# Patient Record
Sex: FEMALE
[De-identification: names, ages, dates, MRNs, and addresses within clinical notes are randomized per-mention and may not be internally consistent; named-entity substitution may affect disease eponyms.]

---

## 2023-05-31 ENCOUNTER — HOSPITAL ENCOUNTER (OUTPATIENT)
Facility: HOSPITAL | Age: 33
Setting detail: SPECIMEN
Discharge: HOME | End: 2023-06-03

## 2023-05-31 LAB
-: NORMAL
25(OH)D3 SERPL-MCNC: 13.6 NG/ML (ref 30–100)
ALBUMIN SERPL-MCNC: 4 G/DL (ref 3.5–5)
ALBUMIN/GLOB SERPL: 1.2 (ref 1.1–2.2)
ALP SERPL-CCNC: 101 U/L (ref 45–117)
ALT SERPL-CCNC: 67 U/L (ref 12–78)
ANION GAP SERPL CALC-SCNC: 4 MMOL/L (ref 5–15)
APPEARANCE UR: CLEAR
AST SERPL-CCNC: 37 U/L (ref 15–37)
BACTERIA URNS QL MICRO: NEGATIVE /HPF
BASOPHILS # BLD: 0 K/UL (ref 0–0.1)
BASOPHILS NFR BLD: 1 % (ref 0–1)
BILIRUB SERPL-MCNC: 0.4 MG/DL (ref 0.2–1)
BILIRUB UR QL: NEGATIVE
BUN SERPL-MCNC: 15 MG/DL (ref 6–20)
BUN/CREAT SERPL: 26 (ref 12–20)
CALCIUM SERPL-MCNC: 8.8 MG/DL (ref 8.5–10.1)
CHLORIDE SERPL-SCNC: 106 MMOL/L (ref 97–108)
CHOLEST SERPL-MCNC: 192 MG/DL
CO2 SERPL-SCNC: 29 MMOL/L (ref 21–32)
COLOR UR: ABNORMAL
CREAT SERPL-MCNC: 0.57 MG/DL (ref 0.55–1.02)
CREAT UR-MCNC: <13 MG/DL
DIFFERENTIAL METHOD BLD: ABNORMAL
EOSINOPHIL # BLD: 0.6 K/UL (ref 0–0.4)
EOSINOPHIL NFR BLD: 7 % (ref 0–7)
EPITH CASTS URNS QL MICRO: ABNORMAL /LPF
ERYTHROCYTE [DISTWIDTH] IN BLOOD BY AUTOMATED COUNT: 13 % (ref 11.5–14.5)
EST. AVERAGE GLUCOSE BLD GHB EST-MCNC: 105 MG/DL
GLOBULIN SER CALC-MCNC: 3.4 G/DL (ref 2–4)
GLUCOSE SERPL-MCNC: 100 MG/DL (ref 65–100)
GLUCOSE UR STRIP.AUTO-MCNC: NEGATIVE MG/DL
HAV IGM SER QL: NONREACTIVE
HBA1C MFR BLD: 5.3 % (ref 4–5.6)
HBV CORE IGM SER QL: NONREACTIVE
HBV SURFACE AG SER QL: <0.1 INDEX
HBV SURFACE AG SER QL: NEGATIVE
HCT VFR BLD AUTO: 39.4 % (ref 35–47)
HCV AB SERPL QL IA: NONREACTIVE
HDLC SERPL-MCNC: 38 MG/DL
HDLC SERPL: 5.1 (ref 0–5)
HGB BLD-MCNC: 13.1 G/DL (ref 11.5–16)
HGB UR QL STRIP: NEGATIVE
HIV 1+2 AB+HIV1 P24 AG SERPL QL IA: NONREACTIVE
HIV 1/2 RESULT COMMENT: NORMAL
HYALINE CASTS URNS QL MICRO: ABNORMAL /LPF (ref 0–5)
IMM GRANULOCYTES # BLD AUTO: 0 K/UL (ref 0–0.04)
IMM GRANULOCYTES NFR BLD AUTO: 0 % (ref 0–0.5)
KETONES UR QL STRIP.AUTO: NEGATIVE MG/DL
LDLC SERPL CALC-MCNC: 111 MG/DL (ref 0–100)
LEUKOCYTE ESTERASE UR QL STRIP.AUTO: ABNORMAL
LYMPHOCYTES # BLD: 4.1 K/UL (ref 0.8–3.5)
LYMPHOCYTES NFR BLD: 50 % (ref 12–49)
MCH RBC QN AUTO: 29.7 PG (ref 26–34)
MCHC RBC AUTO-ENTMCNC: 33.2 G/DL (ref 30–36.5)
MCV RBC AUTO: 89.3 FL (ref 80–99)
MICROALBUMIN UR-MCNC: <0.5 MG/DL
MICROALBUMIN/CREAT UR-RTO: <38 MG/G (ref 0–30)
MONOCYTES # BLD: 0.5 K/UL (ref 0–1)
MONOCYTES NFR BLD: 6 % (ref 5–13)
NEUTS SEG # BLD: 2.9 K/UL (ref 1.8–8)
NEUTS SEG NFR BLD: 36 % (ref 32–75)
NITRITE UR QL STRIP.AUTO: NEGATIVE
NRBC # BLD: 0 K/UL (ref 0–0.01)
NRBC BLD-RTO: 0 PER 100 WBC
PH UR STRIP: 6.5 (ref 5–8)
PLATELET # BLD AUTO: 260 K/UL (ref 150–400)
PMV BLD AUTO: 10.7 FL (ref 8.9–12.9)
POTASSIUM SERPL-SCNC: 4.2 MMOL/L (ref 3.5–5.1)
PROT SERPL-MCNC: 7.4 G/DL (ref 6.4–8.2)
PROT UR STRIP-MCNC: NEGATIVE MG/DL
RBC # BLD AUTO: 4.41 M/UL (ref 3.8–5.2)
RBC #/AREA URNS HPF: ABNORMAL /HPF (ref 0–5)
SODIUM SERPL-SCNC: 139 MMOL/L (ref 136–145)
SP GR UR REFRACTOMETRY: <1.005 (ref 1–1.03)
TRIGL SERPL-MCNC: 215 MG/DL
TSH SERPL DL<=0.05 MIU/L-ACNC: 1.58 UIU/ML (ref 0.36–3.74)
UROBILINOGEN UR QL STRIP.AUTO: 0.2 EU/DL (ref 0.2–1)
VLDLC SERPL CALC-MCNC: 43 MG/DL
WBC # BLD AUTO: 8.2 K/UL (ref 3.6–11)
WBC URNS QL MICRO: ABNORMAL /HPF (ref 0–4)

## 2023-05-31 PROCEDURE — 82043 UR ALBUMIN QUANTITATIVE: CPT

## 2023-05-31 PROCEDURE — 85025 COMPLETE CBC W/AUTO DIFF WBC: CPT

## 2023-05-31 PROCEDURE — 36415 COLL VENOUS BLD VENIPUNCTURE: CPT

## 2023-05-31 PROCEDURE — 80053 COMPREHEN METABOLIC PANEL: CPT

## 2023-05-31 PROCEDURE — 80061 LIPID PANEL: CPT

## 2023-05-31 PROCEDURE — 83036 HEMOGLOBIN GLYCOSYLATED A1C: CPT

## 2023-05-31 PROCEDURE — 82570 ASSAY OF URINE CREATININE: CPT

## 2023-05-31 PROCEDURE — 87389 HIV-1 AG W/HIV-1&-2 AB AG IA: CPT

## 2023-05-31 PROCEDURE — 82306 VITAMIN D 25 HYDROXY: CPT

## 2023-05-31 PROCEDURE — 84443 ASSAY THYROID STIM HORMONE: CPT

## 2023-05-31 PROCEDURE — 80074 ACUTE HEPATITIS PANEL: CPT

## 2023-05-31 PROCEDURE — 81001 URINALYSIS AUTO W/SCOPE: CPT

## 2023-09-22 ENCOUNTER — APPOINTMENT (OUTPATIENT)
Facility: HOSPITAL | Age: 33
End: 2023-09-22
Payer: COMMERCIAL

## 2023-09-22 ENCOUNTER — HOSPITAL ENCOUNTER (EMERGENCY)
Facility: HOSPITAL | Age: 33
Discharge: HOME | End: 2023-09-22
Attending: STUDENT IN AN ORGANIZED HEALTH CARE EDUCATION/TRAINING PROGRAM
Payer: COMMERCIAL

## 2023-09-22 VITALS
DIASTOLIC BLOOD PRESSURE: 65 MMHG | OXYGEN SATURATION: 95 % | WEIGHT: 164.02 LBS | RESPIRATION RATE: 16 BRPM | SYSTOLIC BLOOD PRESSURE: 106 MMHG | BODY MASS INDEX: 33.07 KG/M2 | TEMPERATURE: 98.8 F | HEART RATE: 70 BPM | HEIGHT: 59 IN

## 2023-09-22 DIAGNOSIS — R10.33 PERIUMBILICAL ABDOMINAL PAIN: ICD-10-CM

## 2023-09-22 DIAGNOSIS — K42.9 UMBILICAL HERNIA WITHOUT OBSTRUCTION AND WITHOUT GANGRENE: Primary | ICD-10-CM

## 2023-09-22 LAB
ALBUMIN SERPL-MCNC: 3.9 G/DL (ref 3.5–5)
ALBUMIN/GLOB SERPL: 1 (ref 1.1–2.2)
ALP SERPL-CCNC: 86 U/L (ref 45–117)
ALT SERPL-CCNC: 31 U/L (ref 12–78)
ANION GAP SERPL CALC-SCNC: 12 MMOL/L (ref 5–15)
APPEARANCE UR: CLEAR
AST SERPL-CCNC: 18 U/L (ref 15–37)
BACTERIA URNS QL MICRO: NEGATIVE /HPF
BASOPHILS # BLD: 0 K/UL (ref 0–0.1)
BASOPHILS NFR BLD: 0 % (ref 0–1)
BILIRUB SERPL-MCNC: 0.3 MG/DL (ref 0.2–1)
BILIRUB UR QL: NEGATIVE
BUN SERPL-MCNC: 12 MG/DL (ref 6–20)
BUN/CREAT SERPL: 19 (ref 12–20)
CALCIUM SERPL-MCNC: 8.5 MG/DL (ref 8.5–10.1)
CHLORIDE SERPL-SCNC: 104 MMOL/L (ref 97–108)
CO2 SERPL-SCNC: 24 MMOL/L (ref 21–32)
COLOR UR: ABNORMAL
CREAT SERPL-MCNC: 0.62 MG/DL (ref 0.55–1.02)
DIFFERENTIAL METHOD BLD: ABNORMAL
EOSINOPHIL # BLD: 1 K/UL (ref 0–0.4)
EOSINOPHIL NFR BLD: 10 % (ref 0–7)
EPITH CASTS URNS QL MICRO: ABNORMAL /LPF
ERYTHROCYTE [DISTWIDTH] IN BLOOD BY AUTOMATED COUNT: 13.1 % (ref 11.5–14.5)
GLOBULIN SER CALC-MCNC: 4 G/DL (ref 2–4)
GLUCOSE SERPL-MCNC: 103 MG/DL (ref 65–100)
GLUCOSE UR STRIP.AUTO-MCNC: NEGATIVE MG/DL
HCG UR QL: NEGATIVE
HCT VFR BLD AUTO: 38.2 % (ref 35–47)
HGB BLD-MCNC: 12.8 G/DL (ref 11.5–16)
HGB UR QL STRIP: NEGATIVE
IMM GRANULOCYTES # BLD AUTO: 0 K/UL (ref 0–0.04)
IMM GRANULOCYTES NFR BLD AUTO: 0 % (ref 0–0.5)
KETONES UR QL STRIP.AUTO: NEGATIVE MG/DL
LEUKOCYTE ESTERASE UR QL STRIP.AUTO: ABNORMAL
LIPASE SERPL-CCNC: 164 U/L (ref 73–393)
LYMPHOCYTES # BLD: 4.8 K/UL (ref 0.8–3.5)
LYMPHOCYTES NFR BLD: 47 % (ref 12–49)
MCH RBC QN AUTO: 29.7 PG (ref 26–34)
MCHC RBC AUTO-ENTMCNC: 33.5 G/DL (ref 30–36.5)
MCV RBC AUTO: 88.6 FL (ref 80–99)
MONOCYTES # BLD: 0.7 K/UL (ref 0–1)
MONOCYTES NFR BLD: 7 % (ref 5–13)
NEUTS SEG # BLD: 3.6 K/UL (ref 1.8–8)
NEUTS SEG NFR BLD: 36 % (ref 32–75)
NITRITE UR QL STRIP.AUTO: NEGATIVE
NRBC # BLD: 0 K/UL (ref 0–0.01)
NRBC BLD-RTO: 0 PER 100 WBC
PH UR STRIP: 7 (ref 5–8)
PLATELET # BLD AUTO: 263 K/UL (ref 150–400)
PMV BLD AUTO: 9.9 FL (ref 8.9–12.9)
POTASSIUM SERPL-SCNC: 3.7 MMOL/L (ref 3.5–5.1)
PROT SERPL-MCNC: 7.9 G/DL (ref 6.4–8.2)
PROT UR STRIP-MCNC: ABNORMAL MG/DL
RBC # BLD AUTO: 4.31 M/UL (ref 3.8–5.2)
RBC #/AREA URNS HPF: ABNORMAL /HPF (ref 0–5)
SODIUM SERPL-SCNC: 140 MMOL/L (ref 136–145)
SP GR UR REFRACTOMETRY: 1.02 (ref 1–1.03)
URINE CULTURE IF INDICATED: ABNORMAL
UROBILINOGEN UR QL STRIP.AUTO: 1 EU/DL (ref 0.2–1)
WBC # BLD AUTO: 10.1 K/UL (ref 3.6–11)
WBC URNS QL MICRO: ABNORMAL /HPF (ref 0–4)

## 2023-09-22 PROCEDURE — 36415 COLL VENOUS BLD VENIPUNCTURE: CPT

## 2023-09-22 PROCEDURE — 80053 COMPREHEN METABOLIC PANEL: CPT

## 2023-09-22 PROCEDURE — 85025 COMPLETE CBC W/AUTO DIFF WBC: CPT

## 2023-09-22 PROCEDURE — 81001 URINALYSIS AUTO W/SCOPE: CPT

## 2023-09-22 PROCEDURE — 6360000004 HC RX CONTRAST MEDICATION: Performed by: STUDENT IN AN ORGANIZED HEALTH CARE EDUCATION/TRAINING PROGRAM

## 2023-09-22 PROCEDURE — 96374 THER/PROPH/DIAG INJ IV PUSH: CPT

## 2023-09-22 PROCEDURE — 81025 URINE PREGNANCY TEST: CPT

## 2023-09-22 PROCEDURE — 83690 ASSAY OF LIPASE: CPT

## 2023-09-22 PROCEDURE — 74177 CT ABD & PELVIS W/CONTRAST: CPT

## 2023-09-22 PROCEDURE — 99285 EMERGENCY DEPT VISIT HI MDM: CPT

## 2023-09-22 PROCEDURE — 6360000002 HC RX W HCPCS

## 2023-09-22 RX ORDER — KETOROLAC TROMETHAMINE 30 MG/ML
30 INJECTION, SOLUTION INTRAMUSCULAR; INTRAVENOUS
Status: COMPLETED | OUTPATIENT
Start: 2023-09-22 | End: 2023-09-22

## 2023-09-22 RX ADMIN — KETOROLAC TROMETHAMINE 30 MG: 30 INJECTION, SOLUTION INTRAMUSCULAR; INTRAVENOUS at 17:57

## 2023-09-22 RX ADMIN — IOPAMIDOL 100 ML: 755 INJECTION, SOLUTION INTRAVENOUS at 19:54

## 2023-09-22 ASSESSMENT — ENCOUNTER SYMPTOMS
CONSTIPATION: 0
SHORTNESS OF BREATH: 0
COUGH: 0
ABDOMINAL PAIN: 1
VOMITING: 0
RHINORRHEA: 0
SORE THROAT: 0
BACK PAIN: 0
NAUSEA: 0
DIARRHEA: 0
CHEST TIGHTNESS: 0
WHEEZING: 0

## 2023-09-22 ASSESSMENT — LIFESTYLE VARIABLES
HOW MANY STANDARD DRINKS CONTAINING ALCOHOL DO YOU HAVE ON A TYPICAL DAY: PATIENT DOES NOT DRINK
HOW OFTEN DO YOU HAVE A DRINK CONTAINING ALCOHOL: NEVER

## 2023-09-22 ASSESSMENT — PAIN DESCRIPTION - LOCATION: LOCATION: ABDOMEN

## 2023-09-22 ASSESSMENT — PAIN DESCRIPTION - DESCRIPTORS: DESCRIPTORS: PRESSURE

## 2023-09-22 NOTE — ED PROVIDER NOTES
[facility] EMERGENCY CTR  EMERGENCY DEPARTMENT ENCOUNTER      Pt Name: Marvin Patino  MRN: [id number]  [address] [facility] [place] 1990  Date of evaluation: 9/22/2023  Provider: Moe Cho PA-C    CHIEF COMPLAINT       Chief Complaint   Patient presents with    Abdominal Pain         HISTORY OF PRESENT ILLNESS   (Location/Symptom, Timing/Onset, Context/Setting, Quality, Duration, Modifying Factors, Severity)  Note limiting factors. 27-year-old otherwise healthy female presents with complaint of abdominal pain. Patient states that she was diagnosed with an umbilical hernia in June and was told that she would likely have to get it surgically fixed. Comes in today because she has had increasing pain over the last day. Has not taken any medications prior to arrival.  Denies fever, chest pain, shortness of breath, nausea, vomiting, changes in bowel or bladder. Review of External Medical Records:     Nursing Notes were reviewed. REVIEW OF SYSTEMS    (2-9 systems for level 4, 10 or more for level 5)     Review of Systems   Constitutional:  Negative for appetite change, chills and fever. HENT:  Negative for congestion, ear pain, rhinorrhea and sore throat. Respiratory:  Negative for cough, chest tightness, shortness of breath and wheezing. Cardiovascular:  Negative for chest pain and palpitations. Gastrointestinal:  Positive for abdominal pain. Negative for constipation, diarrhea, nausea and vomiting. Genitourinary:  Negative for decreased urine volume, difficulty urinating, dysuria, frequency, hematuria and urgency. Musculoskeletal:  Negative for back pain. Skin:  Negative for pallor and rash. Neurological:  Negative for dizziness, syncope, weakness, light-headedness and headaches. All other systems reviewed and are negative. Except as noted above the remainder of the review of systems was reviewed and negative. PAST MEDICAL HISTORY   History reviewed.  No pertinent past

## 2023-09-22 NOTE — ED TRIAGE NOTES
Patient arrives with c/o umbilical pain that got worse today. Patient was diagnosed with umbilical hernia 9/87/42 but was unable to seek care. Pain got worse yesterday. Patient only speaks Kinyarwanda.  at bedside with provider.

## 2023-09-23 NOTE — ED NOTES
Discharge instructions provided by Jill Saxena RN in [address] [address]. Patient ambulatory out of department with steady gait accompanied by  and infant child.       Jay Pichardo RN  09/22/23 2047

## 2023-09-25 ENCOUNTER — TELEPHONE (OUTPATIENT)
Age: 33
End: 2023-09-25

## 2023-09-25 LAB — HCG UR QL: NEGATIVE

## 2023-09-25 NOTE — TELEPHONE ENCOUNTER
CALL CENTER MESSAGE  [phone]  From: Ruchi Giles  RE: Arsen Villaseñor  7/19/1988  need to make an appt, ptn has a hernia in the belly, need to make an surgery, ptn only speaks Central African

## 2023-09-28 ENCOUNTER — OFFICE VISIT (OUTPATIENT)
Age: 33
End: 2023-09-28

## 2023-09-28 VITALS
HEART RATE: 61 BPM | RESPIRATION RATE: 20 BRPM | HEIGHT: 59 IN | WEIGHT: 162.5 LBS | DIASTOLIC BLOOD PRESSURE: 68 MMHG | TEMPERATURE: 98.3 F | SYSTOLIC BLOOD PRESSURE: 107 MMHG | BODY MASS INDEX: 32.76 KG/M2 | OXYGEN SATURATION: 99 %

## 2023-09-28 DIAGNOSIS — K43.9 VENTRAL HERNIA WITHOUT OBSTRUCTION OR GANGRENE: ICD-10-CM

## 2023-09-28 RX ORDER — ACETAMINOPHEN 325 MG/1
650 TABLET ORAL EVERY 6 HOURS PRN
COMMUNITY

## 2023-09-28 ASSESSMENT — ENCOUNTER SYMPTOMS
VOMITING: 0
BLOOD IN STOOL: 0
ABDOMINAL DISTENTION: 0
SHORTNESS OF BREATH: 0
CONSTIPATION: 0
ABDOMINAL PAIN: 0
NAUSEA: 0
DIARRHEA: 0

## 2023-09-28 ASSESSMENT — PATIENT HEALTH QUESTIONNAIRE - PHQ9
SUM OF ALL RESPONSES TO PHQ QUESTIONS 1-9: 0
2. FEELING DOWN, DEPRESSED OR HOPELESS: 0
1. LITTLE INTEREST OR PLEASURE IN DOING THINGS: 0
SUM OF ALL RESPONSES TO PHQ9 QUESTIONS 1 & 2: 0
SUM OF ALL RESPONSES TO PHQ QUESTIONS 1-9: 0

## 2023-09-28 NOTE — ASSESSMENT & PLAN NOTE
I discussed the repair options at length and the benefits and drawbacks of both including but not limited to recurrence rates, pain, out of work time and durability. Understanding was expressed and we will proceed with a    Robotic ventral hernia repair. I then discussed the intended procedure as well as alternative treatments including doing nothing. I discussed the risks of surgery at length including but not limited to bleeding, infection, recurrence, damage to bowel, bladder, vessels or solid organs, scarring, chronic pain, need for repeat or larger surgery as well as general risks of surgery including pneumonia, clots in the lungs or legs, heart attack, and death. I answered all questions related to the surgery. After questions were answered, understanding was verbalized. Also discussed returning versus going to the ED if pain worsens or the hernia becomes incarcerated. This was explained to mean if it no longer is able to be pushed back in.

## 2023-10-23 ENCOUNTER — HOSPITAL ENCOUNTER (OUTPATIENT)
Facility: HOSPITAL | Age: 33
Discharge: HOME | End: 2023-10-26
Payer: COMMERCIAL

## 2023-10-23 VITALS
HEIGHT: 61 IN | WEIGHT: 161.38 LBS | BODY MASS INDEX: 30.47 KG/M2 | DIASTOLIC BLOOD PRESSURE: 69 MMHG | TEMPERATURE: 97.9 F | SYSTOLIC BLOOD PRESSURE: 103 MMHG | HEART RATE: 62 BPM

## 2023-10-23 LAB — HCG UR QL: NEGATIVE

## 2023-10-23 PROCEDURE — 81025 URINE PREGNANCY TEST: CPT

## 2023-10-26 ENCOUNTER — ANESTHESIA EVENT (OUTPATIENT)
Facility: HOSPITAL | Age: 33
End: 2023-10-26
Payer: COMMERCIAL

## 2023-10-27 ENCOUNTER — HOSPITAL ENCOUNTER (OUTPATIENT)
Facility: HOSPITAL | Age: 33
Setting detail: OUTPATIENT SURGERY
Discharge: HOME | End: 2023-10-27
Attending: SURGERY | Admitting: SURGERY
Payer: COMMERCIAL

## 2023-10-27 ENCOUNTER — ANESTHESIA (OUTPATIENT)
Facility: HOSPITAL | Age: 33
End: 2023-10-27
Payer: COMMERCIAL

## 2023-10-27 VITALS
HEART RATE: 76 BPM | BODY MASS INDEX: 30.4 KG/M2 | TEMPERATURE: 98.8 F | HEIGHT: 61 IN | RESPIRATION RATE: 16 BRPM | OXYGEN SATURATION: 93 % | DIASTOLIC BLOOD PRESSURE: 74 MMHG | SYSTOLIC BLOOD PRESSURE: 110 MMHG | WEIGHT: 161 LBS

## 2023-10-27 DIAGNOSIS — K43.9 VENTRAL HERNIA WITHOUT OBSTRUCTION OR GANGRENE: Primary | ICD-10-CM

## 2023-10-27 LAB — HCG UR QL: NEGATIVE

## 2023-10-27 PROCEDURE — 7100000010 HC PHASE II RECOVERY - FIRST 15 MIN: Performed by: SURGERY

## 2023-10-27 PROCEDURE — S2900 ROBOTIC SURGICAL SYSTEM: HCPCS | Performed by: SURGERY

## 2023-10-27 PROCEDURE — 6370000000 HC RX 637 (ALT 250 FOR IP): Performed by: ANESTHESIOLOGY

## 2023-10-27 PROCEDURE — 3700000000 HC ANESTHESIA ATTENDED CARE: Performed by: SURGERY

## 2023-10-27 PROCEDURE — 2580000003 HC RX 258: Performed by: ANESTHESIOLOGY

## 2023-10-27 PROCEDURE — 49594 RPR AA HRN 1ST 3-10 NCR/STRN: CPT | Performed by: SURGERY

## 2023-10-27 PROCEDURE — 2709999900 HC NON-CHARGEABLE SUPPLY: Performed by: SURGERY

## 2023-10-27 PROCEDURE — 3600000019 HC SURGERY ROBOT ADDTL 15MIN: Performed by: SURGERY

## 2023-10-27 PROCEDURE — 2580000003 HC RX 258: Performed by: SURGERY

## 2023-10-27 PROCEDURE — 3700000001 HC ADD 15 MINUTES (ANESTHESIA): Performed by: SURGERY

## 2023-10-27 PROCEDURE — 6360000002 HC RX W HCPCS: Performed by: ANESTHESIOLOGY

## 2023-10-27 PROCEDURE — 81025 URINE PREGNANCY TEST: CPT

## 2023-10-27 PROCEDURE — C1781 MESH (IMPLANTABLE): HCPCS | Performed by: SURGERY

## 2023-10-27 PROCEDURE — 7100000011 HC PHASE II RECOVERY - ADDTL 15 MIN: Performed by: SURGERY

## 2023-10-27 PROCEDURE — 6360000002 HC RX W HCPCS: Performed by: SURGERY

## 2023-10-27 PROCEDURE — 3600000009 HC SURGERY ROBOT BASE: Performed by: SURGERY

## 2023-10-27 PROCEDURE — 2720000010 HC SURG SUPPLY STERILE: Performed by: SURGERY

## 2023-10-27 PROCEDURE — 7100000001 HC PACU RECOVERY - ADDTL 15 MIN: Performed by: SURGERY

## 2023-10-27 PROCEDURE — 7100000000 HC PACU RECOVERY - FIRST 15 MIN: Performed by: SURGERY

## 2023-10-27 PROCEDURE — 2500000003 HC RX 250 WO HCPCS: Performed by: ANESTHESIOLOGY

## 2023-10-27 DEVICE — VENTRALIGHT ST MESH, 4" X 6" (10.2 CM X 15.2 CM), ELLIPSE
Type: IMPLANTABLE DEVICE | Site: UMBILICAL | Status: FUNCTIONAL
Brand: VENTRALIGHT

## 2023-10-27 RX ORDER — SODIUM CHLORIDE 0.9 % (FLUSH) 0.9 %
5-40 SYRINGE (ML) INJECTION EVERY 12 HOURS SCHEDULED
Status: DISCONTINUED | OUTPATIENT
Start: 2023-10-27 | End: 2023-10-27 | Stop reason: HOSPADM

## 2023-10-27 RX ORDER — SUCCINYLCHOLINE/SOD CL,ISO/PF 200MG/10ML
SYRINGE (ML) INTRAVENOUS PRN
Status: DISCONTINUED | OUTPATIENT
Start: 2023-10-27 | End: 2023-10-27 | Stop reason: SDUPTHER

## 2023-10-27 RX ORDER — SODIUM CHLORIDE, SODIUM LACTATE, POTASSIUM CHLORIDE, CALCIUM CHLORIDE 600; 310; 30; 20 MG/100ML; MG/100ML; MG/100ML; MG/100ML
INJECTION, SOLUTION INTRAVENOUS CONTINUOUS PRN
Status: DISCONTINUED | OUTPATIENT
Start: 2023-10-27 | End: 2023-10-27 | Stop reason: SDUPTHER

## 2023-10-27 RX ORDER — FENTANYL CITRATE 50 UG/ML
25 INJECTION, SOLUTION INTRAMUSCULAR; INTRAVENOUS EVERY 5 MIN PRN
Status: DISCONTINUED | OUTPATIENT
Start: 2023-10-27 | End: 2023-10-27 | Stop reason: HOSPADM

## 2023-10-27 RX ORDER — HYDROMORPHONE HYDROCHLORIDE 1 MG/ML
0.5 INJECTION, SOLUTION INTRAMUSCULAR; INTRAVENOUS; SUBCUTANEOUS EVERY 5 MIN PRN
Status: DISCONTINUED | OUTPATIENT
Start: 2023-10-27 | End: 2023-10-27 | Stop reason: HOSPADM

## 2023-10-27 RX ORDER — ACETAMINOPHEN 500 MG
1000 TABLET ORAL ONCE
Status: COMPLETED | OUTPATIENT
Start: 2023-10-27 | End: 2023-10-27

## 2023-10-27 RX ORDER — MIDAZOLAM HYDROCHLORIDE 2 MG/2ML
2 INJECTION, SOLUTION INTRAMUSCULAR; INTRAVENOUS
Status: DISCONTINUED | OUTPATIENT
Start: 2023-10-27 | End: 2023-10-27 | Stop reason: HOSPADM

## 2023-10-27 RX ORDER — FENTANYL CITRATE 50 UG/ML
INJECTION, SOLUTION INTRAMUSCULAR; INTRAVENOUS PRN
Status: DISCONTINUED | OUTPATIENT
Start: 2023-10-27 | End: 2023-10-27 | Stop reason: SDUPTHER

## 2023-10-27 RX ORDER — ONDANSETRON 2 MG/ML
INJECTION INTRAMUSCULAR; INTRAVENOUS PRN
Status: DISCONTINUED | OUTPATIENT
Start: 2023-10-27 | End: 2023-10-27 | Stop reason: SDUPTHER

## 2023-10-27 RX ORDER — MIDAZOLAM HYDROCHLORIDE 1 MG/ML
INJECTION INTRAMUSCULAR; INTRAVENOUS PRN
Status: DISCONTINUED | OUTPATIENT
Start: 2023-10-27 | End: 2023-10-27 | Stop reason: SDUPTHER

## 2023-10-27 RX ORDER — DEXAMETHASONE SODIUM PHOSPHATE 4 MG/ML
INJECTION, SOLUTION INTRA-ARTICULAR; INTRALESIONAL; INTRAMUSCULAR; INTRAVENOUS; SOFT TISSUE PRN
Status: DISCONTINUED | OUTPATIENT
Start: 2023-10-27 | End: 2023-10-27 | Stop reason: SDUPTHER

## 2023-10-27 RX ORDER — SODIUM CHLORIDE 0.9 % (FLUSH) 0.9 %
5-40 SYRINGE (ML) INJECTION PRN
Status: DISCONTINUED | OUTPATIENT
Start: 2023-10-27 | End: 2023-10-27 | Stop reason: HOSPADM

## 2023-10-27 RX ORDER — BUPIVACAINE HYDROCHLORIDE 5 MG/ML
INJECTION, SOLUTION EPIDURAL; INTRACAUDAL PRN
Status: DISCONTINUED | OUTPATIENT
Start: 2023-10-27 | End: 2023-10-27 | Stop reason: HOSPADM

## 2023-10-27 RX ORDER — PROPOFOL 10 MG/ML
INJECTION, EMULSION INTRAVENOUS PRN
Status: DISCONTINUED | OUTPATIENT
Start: 2023-10-27 | End: 2023-10-27 | Stop reason: SDUPTHER

## 2023-10-27 RX ORDER — HYDROMORPHONE HYDROCHLORIDE 2 MG/ML
INJECTION, SOLUTION INTRAMUSCULAR; INTRAVENOUS; SUBCUTANEOUS PRN
Status: DISCONTINUED | OUTPATIENT
Start: 2023-10-27 | End: 2023-10-27 | Stop reason: SDUPTHER

## 2023-10-27 RX ORDER — OXYCODONE HYDROCHLORIDE 5 MG/1
5 TABLET ORAL
Status: COMPLETED | OUTPATIENT
Start: 2023-10-27 | End: 2023-10-27

## 2023-10-27 RX ORDER — OXYCODONE HYDROCHLORIDE 5 MG/1
5 TABLET ORAL EVERY 6 HOURS PRN
Qty: 12 TABLET | Refills: 0 | Status: SHIPPED | OUTPATIENT
Start: 2023-10-27 | End: 2023-10-30

## 2023-10-27 RX ORDER — SODIUM CHLORIDE 9 MG/ML
INJECTION, SOLUTION INTRAVENOUS PRN
Status: DISCONTINUED | OUTPATIENT
Start: 2023-10-27 | End: 2023-10-27 | Stop reason: HOSPADM

## 2023-10-27 RX ORDER — LIDOCAINE HYDROCHLORIDE 20 MG/ML
INJECTION, SOLUTION EPIDURAL; INFILTRATION; INTRACAUDAL; PERINEURAL PRN
Status: DISCONTINUED | OUTPATIENT
Start: 2023-10-27 | End: 2023-10-27 | Stop reason: SDUPTHER

## 2023-10-27 RX ORDER — ROCURONIUM BROMIDE 10 MG/ML
INJECTION, SOLUTION INTRAVENOUS PRN
Status: DISCONTINUED | OUTPATIENT
Start: 2023-10-27 | End: 2023-10-27 | Stop reason: SDUPTHER

## 2023-10-27 RX ORDER — FENTANYL CITRATE 50 UG/ML
100 INJECTION, SOLUTION INTRAMUSCULAR; INTRAVENOUS
Status: DISCONTINUED | OUTPATIENT
Start: 2023-10-27 | End: 2023-10-27 | Stop reason: HOSPADM

## 2023-10-27 RX ORDER — PROCHLORPERAZINE EDISYLATE 5 MG/ML
5 INJECTION INTRAMUSCULAR; INTRAVENOUS
Status: COMPLETED | OUTPATIENT
Start: 2023-10-27 | End: 2023-10-27

## 2023-10-27 RX ORDER — SODIUM CHLORIDE, SODIUM LACTATE, POTASSIUM CHLORIDE, CALCIUM CHLORIDE 600; 310; 30; 20 MG/100ML; MG/100ML; MG/100ML; MG/100ML
INJECTION, SOLUTION INTRAVENOUS CONTINUOUS
Status: DISCONTINUED | OUTPATIENT
Start: 2023-10-27 | End: 2023-10-27 | Stop reason: HOSPADM

## 2023-10-27 RX ORDER — HYDRALAZINE HYDROCHLORIDE 20 MG/ML
10 INJECTION INTRAMUSCULAR; INTRAVENOUS
Status: DISCONTINUED | OUTPATIENT
Start: 2023-10-27 | End: 2023-10-27 | Stop reason: HOSPADM

## 2023-10-27 RX ORDER — LIDOCAINE HYDROCHLORIDE 10 MG/ML
1 INJECTION, SOLUTION EPIDURAL; INFILTRATION; INTRACAUDAL; PERINEURAL
Status: DISCONTINUED | OUTPATIENT
Start: 2023-10-27 | End: 2023-10-27 | Stop reason: HOSPADM

## 2023-10-27 RX ORDER — ONDANSETRON 2 MG/ML
4 INJECTION INTRAMUSCULAR; INTRAVENOUS
Status: DISCONTINUED | OUTPATIENT
Start: 2023-10-27 | End: 2023-10-27 | Stop reason: HOSPADM

## 2023-10-27 RX ADMIN — PROPOFOL 150 MG: 10 INJECTION, EMULSION INTRAVENOUS at 07:38

## 2023-10-27 RX ADMIN — MIDAZOLAM 2 MG: 1 INJECTION INTRAMUSCULAR; INTRAVENOUS at 07:26

## 2023-10-27 RX ADMIN — SUGAMMADEX 300 MG: 100 INJECTION, SOLUTION INTRAVENOUS at 09:05

## 2023-10-27 RX ADMIN — SODIUM CHLORIDE, POTASSIUM CHLORIDE, SODIUM LACTATE AND CALCIUM CHLORIDE: 600; 310; 30; 20 INJECTION, SOLUTION INTRAVENOUS at 07:38

## 2023-10-27 RX ADMIN — ONDANSETRON HYDROCHLORIDE 4 MG: 2 INJECTION, SOLUTION INTRAMUSCULAR; INTRAVENOUS at 08:53

## 2023-10-27 RX ADMIN — ACETAMINOPHEN 1000 MG: 500 TABLET ORAL at 06:43

## 2023-10-27 RX ADMIN — WATER 2000 MG: 1 INJECTION INTRAMUSCULAR; INTRAVENOUS; SUBCUTANEOUS at 07:47

## 2023-10-27 RX ADMIN — FENTANYL CITRATE 25 MCG: 50 INJECTION INTRAMUSCULAR; INTRAVENOUS at 10:14

## 2023-10-27 RX ADMIN — OXYCODONE HYDROCHLORIDE 5 MG: 5 TABLET ORAL at 10:54

## 2023-10-27 RX ADMIN — ROCURONIUM BROMIDE 5 MG: 10 INJECTION, SOLUTION INTRAVENOUS at 07:38

## 2023-10-27 RX ADMIN — ROCURONIUM BROMIDE 30 MG: 10 INJECTION, SOLUTION INTRAVENOUS at 08:44

## 2023-10-27 RX ADMIN — FENTANYL CITRATE 50 MCG: 50 INJECTION INTRAMUSCULAR; INTRAVENOUS at 09:52

## 2023-10-27 RX ADMIN — LIDOCAINE HYDROCHLORIDE 80 MG: 20 INJECTION, SOLUTION EPIDURAL; INFILTRATION; INTRACAUDAL; PERINEURAL at 07:38

## 2023-10-27 RX ADMIN — FENTANYL CITRATE 100 MCG: 50 INJECTION, SOLUTION INTRAMUSCULAR; INTRAVENOUS at 07:38

## 2023-10-27 RX ADMIN — SODIUM CHLORIDE, POTASSIUM CHLORIDE, SODIUM LACTATE AND CALCIUM CHLORIDE: 600; 310; 30; 20 INJECTION, SOLUTION INTRAVENOUS at 06:44

## 2023-10-27 RX ADMIN — ROCURONIUM BROMIDE 45 MG: 10 INJECTION, SOLUTION INTRAVENOUS at 07:47

## 2023-10-27 RX ADMIN — PROCHLORPERAZINE EDISYLATE 5 MG: 5 INJECTION INTRAMUSCULAR; INTRAVENOUS at 10:18

## 2023-10-27 RX ADMIN — DEXAMETHASONE SODIUM PHOSPHATE 4 MG: 4 INJECTION INTRA-ARTICULAR; INTRALESIONAL; INTRAMUSCULAR; INTRAVENOUS; SOFT TISSUE at 07:47

## 2023-10-27 RX ADMIN — FENTANYL CITRATE 25 MCG: 50 INJECTION INTRAMUSCULAR; INTRAVENOUS at 09:38

## 2023-10-27 RX ADMIN — HYDROMORPHONE HYDROCHLORIDE 0.5 MG: 2 INJECTION, SOLUTION INTRAMUSCULAR; INTRAVENOUS; SUBCUTANEOUS at 08:44

## 2023-10-27 RX ADMIN — HYDROMORPHONE HYDROCHLORIDE 0.5 MG: 2 INJECTION, SOLUTION INTRAMUSCULAR; INTRAVENOUS; SUBCUTANEOUS at 09:09

## 2023-10-27 RX ADMIN — Medication 100 MG: at 07:38

## 2023-10-27 ASSESSMENT — PAIN DESCRIPTION - LOCATION
LOCATION: ABDOMEN

## 2023-10-27 ASSESSMENT — PAIN DESCRIPTION - ORIENTATION: ORIENTATION: ANTERIOR

## 2023-10-27 ASSESSMENT — PAIN SCALES - GENERAL
PAINLEVEL_OUTOF10: 5
PAINLEVEL_OUTOF10: 7
PAINLEVEL_OUTOF10: 8
PAINLEVEL_OUTOF10: 7

## 2023-10-27 NOTE — ANESTHESIA POSTPROCEDURE EVALUATION
Department of Anesthesiology  Postprocedure Note    Patient: Aissatou Torres  MRN: [id number]  YOB: 1990  Date of evaluation: 10/27/2023      Procedure Summary     Date: 10/27/23 Room / Location: [facility] MAIN OR [address] [facility] MAIN OR    Anesthesia Start: 0732 Anesthesia Stop: 2997    Procedure: ROBOTIC VENTRAL HERNIA REPAIR WITH MESH (Abdomen) Diagnosis:       Ventral hernia without obstruction or gangrene      (Ventral hernia without obstruction or gangrene [K43.9])    Providers: Ugo Dumas MD Responsible Provider: Roebrt Sauceda DO    Anesthesia Type: general ASA Status: 2          Anesthesia Type: No value filed.     Maya Phase I: Maya Score: 10    Maya Phase II:        Anesthesia Post Evaluation    Patient location during evaluation: PACU  Level of consciousness: awake  Airway patency: patent  Nausea & Vomiting: no nausea  Complications: no  Cardiovascular status: hemodynamically stable  Respiratory status: acceptable  Hydration status: stable  Multimodal analgesia pain management approach  Pain management: adequate

## 2023-10-27 NOTE — INTERVAL H&P NOTE
Update History & Physical    The patient's History and Physical of September 28, 2023 was reviewed with the patient and I examined the patient. There was no change. The surgical site was confirmed by the patient and me. Plan: The risks, benefits, expected outcome, and alternative to the recommended procedure have been discussed with the patient. Patient understands and wants to proceed with the procedure.      Electronically signed by Joseph Sarah MD on 10/27/2023 at 7:25 AM

## 2023-10-27 NOTE — OP NOTE
[facility]  Operative Note      Patient: Anju Forbes  YOB: 1990  MRN: [id number]    Date of Procedure: 10/27/2023    Indications: Presented with ventral hernia. I discussed the intended procedure as well as alternative treatments including doing nothing. I discussed the risks of surgery at length including but not limited to bleeding, infection, damage to bowel, bladder, vessels or solid organs, scarring, need for repeat or larger surgery as well as general risks of surgery including pneumonia, clots in the lungs or legs, heart attack, and death. I answered all questions related to the surgery. Understanding was verbalized and we will proceed as planned. Pre-Op Diagnosis: Ventral hernia without obstruction or gangrene [K43.9]    Post-Op Diagnosis:  incarcerated ventral hernia       Procedure(s):  ROBOTIC VENTRAL HERNIA REPAIR WITH MESH    Surgeon(s):  Flavio Beauchamp MD    Assistant:   Surgical Assistant: Tyrone Reid    Anesthesia: General    Estimated Blood Loss (mL): Minimal    Complications: None    Specimens:   * No specimens in log *    Implants:  Implant Name Type Inv. Item Serial No.  Lot No. LRB No. Used Action   MESH SURG [id number] ELLIPSE [employer] VENTRALIGHT - SNA Mesh MESH SURG [id number] ELLIPSE [employer] VENTRALIGHT NA BARD DAVOL-WD [id number] N/A 1 Implanted         Drains: * No LDAs found *    Findings: 3.5 cm ventral defect with chronically incarcerated omentum. I reduced the hernia sac and then repaired the defect with 1 strattafix and buttressed it with 4\" x 6\" ventralight mesh which was sutured around the edge    Detailed Description of Procedure:   Pt prepped and draped in usual sterile fashion after time out was performed. Initial entry was in the left upper quadrant.    And made a small skin incision after injecting local anesthesia, and dissected down to the fascia I then inserted a Veress needle

## 2023-10-27 NOTE — PERIOP NOTE
PATIENT INTERVIEWED IN PREOP WITH LANGUAGE LINE (MERLYN #[id number]. Tyrone Eduardo NAME BAND VISIBLE AND CORRECT PER PATIENT. PATIENT HAS UNDERSTANDING OF PROCEDURE AND SURGICAL SITE. EDUCATIONAL NEEDS MET. PATIENT STATES NO PAIN AT THIS TIME.

## 2023-11-10 ENCOUNTER — OFFICE VISIT (OUTPATIENT)
Age: 33
End: 2023-11-10

## 2023-11-10 VITALS
RESPIRATION RATE: 18 BRPM | HEIGHT: 60 IN | BODY MASS INDEX: 31.33 KG/M2 | DIASTOLIC BLOOD PRESSURE: 62 MMHG | HEART RATE: 61 BPM | SYSTOLIC BLOOD PRESSURE: 103 MMHG | OXYGEN SATURATION: 98 % | WEIGHT: 159.6 LBS | TEMPERATURE: 98 F

## 2023-11-10 DIAGNOSIS — Z09 POSTOPERATIVE EXAMINATION: ICD-10-CM

## 2023-11-10 DIAGNOSIS — K43.9 VENTRAL HERNIA WITHOUT OBSTRUCTION OR GANGRENE: Primary | ICD-10-CM

## 2023-11-10 PROCEDURE — 99024 POSTOP FOLLOW-UP VISIT: CPT | Performed by: NURSE PRACTITIONER

## 2023-11-10 ASSESSMENT — PATIENT HEALTH QUESTIONNAIRE - PHQ9
SUM OF ALL RESPONSES TO PHQ QUESTIONS 1-9: 0
1. LITTLE INTEREST OR PLEASURE IN DOING THINGS: 0
SUM OF ALL RESPONSES TO PHQ9 QUESTIONS 1 & 2: 0
SUM OF ALL RESPONSES TO PHQ QUESTIONS 1-9: 0
2. FEELING DOWN, DEPRESSED OR HOPELESS: 0

## 2024-07-17 ENCOUNTER — APPOINTMENT (OUTPATIENT)
Facility: HOSPITAL | Age: 34
End: 2024-07-17
Payer: COMMERCIAL

## 2024-07-17 ENCOUNTER — HOSPITAL ENCOUNTER (INPATIENT)
Facility: HOSPITAL | Age: 34
LOS: 1 days | Discharge: HOME | End: 2024-07-18
Attending: STUDENT IN AN ORGANIZED HEALTH CARE EDUCATION/TRAINING PROGRAM | Admitting: OBSTETRICS & GYNECOLOGY
Payer: COMMERCIAL

## 2024-07-17 DIAGNOSIS — Z34.90 EARLY STAGE OF PREGNANCY: ICD-10-CM

## 2024-07-17 DIAGNOSIS — R10.31 ABDOMINAL PAIN, RIGHT LOWER QUADRANT: Primary | ICD-10-CM

## 2024-07-17 LAB
ALBUMIN SERPL-MCNC: 3.8 G/DL (ref 3.5–5)
ALBUMIN/GLOB SERPL: 1 (ref 1.1–2.2)
ALP SERPL-CCNC: 80 U/L (ref 45–117)
ALT SERPL-CCNC: 25 U/L (ref 12–78)
ANION GAP SERPL CALC-SCNC: 13 MMOL/L (ref 5–15)
AST SERPL-CCNC: 17 U/L (ref 15–37)
BASOPHILS # BLD: 0 K/UL (ref 0–0.1)
BASOPHILS NFR BLD: 0 % (ref 0–1)
BILIRUB SERPL-MCNC: 0.4 MG/DL (ref 0.2–1)
BUN SERPL-MCNC: 15 MG/DL (ref 6–20)
BUN/CREAT SERPL: 21 (ref 12–20)
CALCIUM SERPL-MCNC: 8.8 MG/DL (ref 8.5–10.1)
CHLORIDE SERPL-SCNC: 102 MMOL/L (ref 97–108)
CO2 SERPL-SCNC: 22 MMOL/L (ref 21–32)
CREAT SERPL-MCNC: 0.72 MG/DL (ref 0.55–1.02)
DIFFERENTIAL METHOD BLD: ABNORMAL
EOSINOPHIL # BLD: 0.7 K/UL (ref 0–0.4)
EOSINOPHIL NFR BLD: 6 % (ref 0–7)
ERYTHROCYTE [DISTWIDTH] IN BLOOD BY AUTOMATED COUNT: 13.1 % (ref 11.5–14.5)
GLOBULIN SER CALC-MCNC: 3.7 G/DL (ref 2–4)
GLUCOSE SERPL-MCNC: 120 MG/DL (ref 65–100)
HCG UR QL: POSITIVE
HCT VFR BLD AUTO: 33.4 % (ref 35–47)
HGB BLD-MCNC: 11.8 G/DL (ref 11.5–16)
IMM GRANULOCYTES # BLD AUTO: 0.1 K/UL (ref 0–0.04)
IMM GRANULOCYTES NFR BLD AUTO: 1 % (ref 0–0.5)
LIPASE SERPL-CCNC: 53 U/L (ref 13–75)
LYMPHOCYTES # BLD: 4.6 K/UL (ref 0.8–3.5)
LYMPHOCYTES NFR BLD: 38 % (ref 12–49)
MCH RBC QN AUTO: 29.8 PG (ref 26–34)
MCHC RBC AUTO-ENTMCNC: 35.3 G/DL (ref 30–36.5)
MCV RBC AUTO: 84.3 FL (ref 80–99)
MONOCYTES # BLD: 0.7 K/UL (ref 0–1)
MONOCYTES NFR BLD: 6 % (ref 5–13)
NEUTS SEG # BLD: 6 K/UL (ref 1.8–8)
NEUTS SEG NFR BLD: 49 % (ref 32–75)
NRBC # BLD: 0 K/UL (ref 0–0.01)
NRBC BLD-RTO: 0 PER 100 WBC
PLATELET # BLD AUTO: 283 K/UL (ref 150–400)
PMV BLD AUTO: 10.2 FL (ref 8.9–12.9)
POTASSIUM SERPL-SCNC: 3.7 MMOL/L (ref 3.5–5.1)
PROT SERPL-MCNC: 7.5 G/DL (ref 6.4–8.2)
RBC # BLD AUTO: 3.96 M/UL (ref 3.8–5.2)
SODIUM SERPL-SCNC: 137 MMOL/L (ref 136–145)
WBC # BLD AUTO: 12.1 K/UL (ref 3.6–11)

## 2024-07-17 PROCEDURE — 84702 CHORIONIC GONADOTROPIN TEST: CPT

## 2024-07-17 PROCEDURE — 99285 EMERGENCY DEPT VISIT HI MDM: CPT

## 2024-07-17 PROCEDURE — 81025 URINE PREGNANCY TEST: CPT

## 2024-07-17 PROCEDURE — 80053 COMPREHEN METABOLIC PANEL: CPT

## 2024-07-17 PROCEDURE — 83690 ASSAY OF LIPASE: CPT

## 2024-07-17 PROCEDURE — 36415 COLL VENOUS BLD VENIPUNCTURE: CPT

## 2024-07-17 PROCEDURE — 85025 COMPLETE CBC W/AUTO DIFF WBC: CPT

## 2024-07-17 RX ORDER — MORPHINE SULFATE 4 MG/ML
4 INJECTION, SOLUTION INTRAMUSCULAR; INTRAVENOUS
Status: DISCONTINUED | OUTPATIENT
Start: 2024-07-17 | End: 2024-07-18

## 2024-07-18 ENCOUNTER — APPOINTMENT (OUTPATIENT)
Facility: HOSPITAL | Age: 34
End: 2024-07-18
Payer: COMMERCIAL

## 2024-07-18 VITALS
TEMPERATURE: 98.1 F | WEIGHT: 175 LBS | HEART RATE: 59 BPM | BODY MASS INDEX: 34.18 KG/M2 | DIASTOLIC BLOOD PRESSURE: 67 MMHG | RESPIRATION RATE: 14 BRPM | SYSTOLIC BLOOD PRESSURE: 102 MMHG | OXYGEN SATURATION: 97 %

## 2024-07-18 PROBLEM — O26.891 ABDOMINAL PAIN IN PREGNANCY, FIRST TRIMESTER: Status: ACTIVE | Noted: 2024-07-18

## 2024-07-18 PROBLEM — R10.9 ABDOMINAL PAIN IN PREGNANCY, FIRST TRIMESTER: Status: ACTIVE | Noted: 2024-07-18

## 2024-07-18 LAB
ABO + RH BLD: NORMAL
BASOPHILS # BLD: 0 K/UL (ref 0–0.1)
BASOPHILS # BLD: 0 K/UL (ref 0–0.1)
BASOPHILS NFR BLD: 0 % (ref 0–1)
BASOPHILS NFR BLD: 0 % (ref 0–1)
BLOOD GROUP ANTIBODIES SERPL: NORMAL
DIFFERENTIAL METHOD BLD: ABNORMAL
DIFFERENTIAL METHOD BLD: ABNORMAL
EOSINOPHIL # BLD: 0.4 K/UL (ref 0–0.4)
EOSINOPHIL # BLD: 0.6 K/UL (ref 0–0.4)
EOSINOPHIL NFR BLD: 4 % (ref 0–7)
EOSINOPHIL NFR BLD: 7 % (ref 0–7)
ERYTHROCYTE [DISTWIDTH] IN BLOOD BY AUTOMATED COUNT: 13.1 % (ref 11.5–14.5)
ERYTHROCYTE [DISTWIDTH] IN BLOOD BY AUTOMATED COUNT: 13.2 % (ref 11.5–14.5)
HCG SERPL-ACNC: 137 MIU/ML (ref 0–6)
HCT VFR BLD AUTO: 30.7 % (ref 35–47)
HCT VFR BLD AUTO: 31.9 % (ref 35–47)
HCT VFR BLD AUTO: 32.1 % (ref 35–47)
HGB BLD-MCNC: 11 G/DL (ref 11.5–16)
HGB BLD-MCNC: 11.2 G/DL (ref 11.5–16)
HGB BLD-MCNC: 11.3 G/DL (ref 11.5–16)
IMM GRANULOCYTES # BLD AUTO: 0 K/UL (ref 0–0.04)
IMM GRANULOCYTES # BLD AUTO: 0 K/UL (ref 0–0.04)
IMM GRANULOCYTES NFR BLD AUTO: 0 % (ref 0–0.5)
IMM GRANULOCYTES NFR BLD AUTO: 0 % (ref 0–0.5)
LYMPHOCYTES # BLD: 3.6 K/UL (ref 0.8–3.5)
LYMPHOCYTES # BLD: 3.8 K/UL (ref 0.8–3.5)
LYMPHOCYTES NFR BLD: 39 % (ref 12–49)
LYMPHOCYTES NFR BLD: 42 % (ref 12–49)
MCH RBC QN AUTO: 29.1 PG (ref 26–34)
MCH RBC QN AUTO: 30.4 PG (ref 26–34)
MCHC RBC AUTO-ENTMCNC: 34.3 G/DL (ref 30–36.5)
MCHC RBC AUTO-ENTMCNC: 36.5 G/DL (ref 30–36.5)
MCV RBC AUTO: 83.4 FL (ref 80–99)
MCV RBC AUTO: 84.9 FL (ref 80–99)
MONOCYTES # BLD: 0.5 K/UL (ref 0–1)
MONOCYTES # BLD: 0.5 K/UL (ref 0–1)
MONOCYTES NFR BLD: 5 % (ref 5–13)
MONOCYTES NFR BLD: 5 % (ref 5–13)
NEUTS SEG # BLD: 3.9 K/UL (ref 1.8–8)
NEUTS SEG # BLD: 4.9 K/UL (ref 1.8–8)
NEUTS SEG NFR BLD: 46 % (ref 32–75)
NEUTS SEG NFR BLD: 52 % (ref 32–75)
NRBC # BLD: 0 K/UL (ref 0–0.01)
NRBC # BLD: 0 K/UL (ref 0–0.01)
NRBC BLD-RTO: 0 PER 100 WBC
NRBC BLD-RTO: 0 PER 100 WBC
PLATELET # BLD AUTO: 244 K/UL (ref 150–400)
PLATELET # BLD AUTO: 250 K/UL (ref 150–400)
PMV BLD AUTO: 9.4 FL (ref 8.9–12.9)
PMV BLD AUTO: 9.8 FL (ref 8.9–12.9)
RBC # BLD AUTO: 3.68 M/UL (ref 3.8–5.2)
RBC # BLD AUTO: 3.78 M/UL (ref 3.8–5.2)
SPECIMEN EXP DATE BLD: NORMAL
WBC # BLD AUTO: 8.6 K/UL (ref 3.6–11)
WBC # BLD AUTO: 9.6 K/UL (ref 3.6–11)

## 2024-07-18 PROCEDURE — 86901 BLOOD TYPING SEROLOGIC RH(D): CPT

## 2024-07-18 PROCEDURE — 6360000002 HC RX W HCPCS: Performed by: STUDENT IN AN ORGANIZED HEALTH CARE EDUCATION/TRAINING PROGRAM

## 2024-07-18 PROCEDURE — 2580000003 HC RX 258: Performed by: OBSTETRICS & GYNECOLOGY

## 2024-07-18 PROCEDURE — 1120000000 HC RM PRIVATE OB

## 2024-07-18 PROCEDURE — 76801 OB US < 14 WKS SINGLE FETUS: CPT

## 2024-07-18 PROCEDURE — 85014 HEMATOCRIT: CPT

## 2024-07-18 PROCEDURE — 96374 THER/PROPH/DIAG INJ IV PUSH: CPT

## 2024-07-18 PROCEDURE — 6370000000 HC RX 637 (ALT 250 FOR IP): Performed by: OBSTETRICS & GYNECOLOGY

## 2024-07-18 PROCEDURE — 86850 RBC ANTIBODY SCREEN: CPT

## 2024-07-18 PROCEDURE — 86900 BLOOD TYPING SEROLOGIC ABO: CPT

## 2024-07-18 PROCEDURE — 36415 COLL VENOUS BLD VENIPUNCTURE: CPT

## 2024-07-18 PROCEDURE — 85025 COMPLETE CBC W/AUTO DIFF WBC: CPT

## 2024-07-18 PROCEDURE — 85018 HEMOGLOBIN: CPT

## 2024-07-18 PROCEDURE — 76817 TRANSVAGINAL US OBSTETRIC: CPT

## 2024-07-18 RX ORDER — ACETAMINOPHEN 500 MG
1000 TABLET ORAL EVERY 8 HOURS PRN
Status: DISCONTINUED | OUTPATIENT
Start: 2024-07-18 | End: 2024-07-18 | Stop reason: HOSPADM

## 2024-07-18 RX ORDER — HYDROMORPHONE HYDROCHLORIDE 1 MG/ML
1 INJECTION, SOLUTION INTRAMUSCULAR; INTRAVENOUS; SUBCUTANEOUS
Status: DISCONTINUED | OUTPATIENT
Start: 2024-07-18 | End: 2024-07-18 | Stop reason: HOSPADM

## 2024-07-18 RX ORDER — SODIUM CHLORIDE, SODIUM LACTATE, POTASSIUM CHLORIDE, CALCIUM CHLORIDE 600; 310; 30; 20 MG/100ML; MG/100ML; MG/100ML; MG/100ML
INJECTION, SOLUTION INTRAVENOUS CONTINUOUS
Status: DISCONTINUED | OUTPATIENT
Start: 2024-07-18 | End: 2024-07-18 | Stop reason: HOSPADM

## 2024-07-18 RX ORDER — IBUPROFEN 400 MG/1
800 TABLET ORAL EVERY 8 HOURS SCHEDULED
Status: DISCONTINUED | OUTPATIENT
Start: 2024-07-18 | End: 2024-07-18

## 2024-07-18 RX ORDER — ACETAMINOPHEN 500 MG
1000 TABLET ORAL EVERY 8 HOURS SCHEDULED
Status: DISCONTINUED | OUTPATIENT
Start: 2024-07-18 | End: 2024-07-18

## 2024-07-18 RX ORDER — IBUPROFEN 400 MG/1
800 TABLET ORAL EVERY 8 HOURS PRN
Status: DISCONTINUED | OUTPATIENT
Start: 2024-07-18 | End: 2024-07-18 | Stop reason: HOSPADM

## 2024-07-18 RX ORDER — FENTANYL CITRATE 50 UG/ML
100 INJECTION, SOLUTION INTRAMUSCULAR; INTRAVENOUS ONCE
Status: COMPLETED | OUTPATIENT
Start: 2024-07-18 | End: 2024-07-18

## 2024-07-18 RX ORDER — ONDANSETRON 2 MG/ML
4 INJECTION INTRAMUSCULAR; INTRAVENOUS EVERY 6 HOURS PRN
Status: DISCONTINUED | OUTPATIENT
Start: 2024-07-18 | End: 2024-07-18 | Stop reason: HOSPADM

## 2024-07-18 RX ORDER — ONDANSETRON 4 MG/1
4 TABLET, ORALLY DISINTEGRATING ORAL EVERY 6 HOURS PRN
Status: DISCONTINUED | OUTPATIENT
Start: 2024-07-18 | End: 2024-07-18 | Stop reason: HOSPADM

## 2024-07-18 RX ADMIN — SODIUM CHLORIDE, POTASSIUM CHLORIDE, SODIUM LACTATE AND CALCIUM CHLORIDE: 600; 310; 30; 20 INJECTION, SOLUTION INTRAVENOUS at 05:38

## 2024-07-18 RX ADMIN — ACETAMINOPHEN 1000 MG: 500 TABLET ORAL at 05:38

## 2024-07-18 RX ADMIN — IBUPROFEN 800 MG: 400 TABLET, FILM COATED ORAL at 05:37

## 2024-07-18 RX ADMIN — FENTANYL CITRATE 100 MCG: 50 INJECTION, SOLUTION INTRAMUSCULAR; INTRAVENOUS at 00:15

## 2024-07-18 ASSESSMENT — PAIN SCALES - GENERAL
PAINLEVEL_OUTOF10: 2
PAINLEVEL_OUTOF10: 0

## 2024-07-18 NOTE — CONSULTS
Updated patient on room assignment at [facility]. Patient denied any other questions at this time. Patient remains attached to monitor x2 and IVF infusing.     Session ID: [id number]  Language: Mongolian   ID: #[id number]   Name: Madelyn

## 2024-07-18 NOTE — DISCHARGE INSTRUCTIONS
Suspected Ectopic Pregnancy: Care Instructions  Overview     Your doctor thinks you may have an ectopic pregnancy. This means that a fertilized egg has attached to a place outside the uterus. In most of these cases, the egg grows in a fallopian tube. This is also called a tubal pregnancy. In rare cases, the egg grows in an ovary or another place in the belly.  An ectopic pregnancy cannot develop normally. It can be painful and very dangerous. Sometimes an ectopic pregnancy stops on its own and the body absorbs it over time. But if it doesn't, you will need treatment.  You may have expectant management. This means your doctor monitors your pregnancy hormone levels to check if they are dropping on their own. Or you may have treatment right away. But it may or may not be legal to treat a suspected ectopic pregnancy where you live. In some places the diagnosis of an ectopic pregnancy must be certain before treatment can be started. Talk to your doctor about your options.  The doctor has checked you carefully. But you may need to come back for more tests. Problems can develop later. Watch carefully for belly pain or bleeding. Call your doctor right away if you have any new or increased pain or bleeding. If you have other symptoms, such as shoulder pain, dizziness, lightheadedness, or fainting, get medical care right away. These could be signs of internal bleeding.  Follow-up care is a key part of your treatment and safety. Be sure to make and go to all appointments, and call your doctor if you are having problems. It's also a good idea to know your test results and keep a list of the medicines you take.  How can you care for yourself at home?  Rest when you feel tired. You may be more tired than normal for a few weeks.  Avoid moving quickly or lifting anything heavy until your doctor tells you it is safe to do your normal activities.  Ask your doctor when you can have vaginal sex again.  Your doctor might want you

## 2024-07-18 NOTE — ED NOTES
Bedside shift change report given to Kylee (oncoming nurse) by Laurie (offgoing nurse). Report included the following information Nurse Handoff Report, ED Encounter Summary, ED SBAR, MAR, and Recent Results.

## 2024-07-18 NOTE — ED NOTES
Patient stable at time of transfer. Report given to [facility] staff for transport. [facility] provided with PCS, encounter summary, and facesheet for transport. Patient out of department via stretcher accompanied by [facility] staff.

## 2024-07-18 NOTE — PROGRESS NOTES
TRANSFER - IN REPORT:    Verbal report received from EVA Lynn RN on Daisy Gasca  being received from [facility] for routine progression of patient care      Report consisted of patient's Situation, Background, Assessment and   Recommendations(SBAR).     Information from the following report(s) Nurse Handoff Report, Index, Adult Overview, Intake/Output, MAR, and Recent Results was reviewed with the receiving nurse.    Opportunity for questions and clarification was provided.      Assessment completed upon patient's arrival to unit and care assumed.      1304- interpretor number [id number] used for questions and assessment    1320- Called OB, Dr. Johnston, to notify of patient's arrival to unit    1847- Interpretor #[id number] used for discharge instructions    1858- Previous interpretor disconnected. Interpretor #[id number]

## 2024-07-18 NOTE — ED PROVIDER NOTES
[facility] EMERGENCY CTR  EMERGENCY DEPARTMENT ENCOUNTER      Pt Name: Daisy Gasca  MRN: [id number]  Birthdate 1990  Date of evaluation: 2024  Provider: Trista Cisneros MD    CHIEF COMPLAINT       Chief Complaint   Patient presents with    Abdominal Pain         HISTORY OF PRESENT ILLNESS    Nursing Triage Notes were reviewed.    HPI    Daisy Gasca is a 33 y.o. female with a history of ventral hernia repair in  and prior  who presents to the emergency department for evaluation of diffuse abdominal pain for approximately an hour and a half.  Patient states that pain feels similar to labor with severe, diffuse pain across her entire abdomen.  Onset approximately 20 minutes after sexual intercourse.  She reports she took a dose of Tylenol approximately an hour and a half ago without any relief.  Reports she had milder pain yesterday near surgical incisions from her ventral hernia repair last year.  Resolved without issue and she felt fine for most of today.  Denies any genitourinary complaints including pain with urination, hematuria, vaginal discharge, or vaginal bleeding.  Reports her last menstrual period was 6/10. Sexually active with one partner.         PAST MEDICAL HISTORY   History reviewed. No pertinent past medical history.      SURGICAL HISTORY       Past Surgical History:   Procedure Laterality Date     SECTION  2016    HERNIA REPAIR N/A 10/27/2023    ROBOTIC VENTRAL HERNIA REPAIR WITH MESH performed by Flavio Beauchamp Jr., MD at [facility] MAIN OR         CURRENT MEDICATIONS       Previous Medications    ACETAMINOPHEN (TYLENOL) 325 MG TABLET    Take 2 tablets by mouth every 6 hours as needed for Pain       ALLERGIES     Patient has no known allergies.    FAMILY HISTORY       Family History   Problem Relation Age of Onset    No Known Problems Mother     No Known Problems Father     No Known Problems Sister     No Known Problems Sister     No Known Problems  nursing pregnancy test positive. Bedside ultrasound obtained with concern for  [LT]   0207 Structure in the adnexa measuring 1.5 cm, which is larger in size than anticipated with LMP Cheryl 10, no intrauterine pregnancy at this time making exclusion of ectopic pregnancy difficult, small amount of free fluid at this time in pelvis. Uncertain whether this may represent ectopic pregnancy. Agree with plan for discussion with OB/GYN. [LT]   0233 Patient reports pain is improved but still having sharp pain every 5 minutes and significant tenderness to palpation. Sharp pains are now localized in the right lower abdomen. Last OB appointment with last pregnancy 2 years ago when her son was born. Difficult to differentiate ruptured ovarian cyst from ruptured ectopic at this point with indeterminate workup. Patient remains hemodynamically stable. Will discuss with OB.  [LT]   0300 Discussed with on call OB/GYN, recommended discussing with OB hospitalist at [facility] [place] for serial exams.  [LT]   0309 OB hospitalist paged [LT]      ED Course User Index  [LT] Trista Cisneros MD           CONSULTS:  IP CONSULT TO OB GYN    PROCEDURES:  Unless otherwise noted below, none     Procedures      FINAL IMPRESSION      1. Abdominal pain, right lower quadrant    2. Early stage of pregnancy          DISPOSITION/PLAN   DISPOSITION Admitted 07/18/2024 04:24:14 AM    Admission to the OB hospitalist service for further management    (Please note that portions of this note were completed with a voice recognition program.  Efforts were made to edit the dictations but occasionally words are mis-transcribed.)    Trista Cisneros MD (electronically signed)  Emergency Attending Physician       Trista Cisneros MD  07/18/24 9031

## 2024-07-18 NOTE — ED NOTES
Patient ambulated to bathroom with steady gait. Back to bed attached to monitor x2. IVF infusing. Call bell within reach.

## 2024-07-18 NOTE — ED TRIAGE NOTES
Pt complaining of diffuse abdominal pain x30 minutes.  Denies urinary symptoms.  Denies nausea/vomiting

## 2024-07-18 NOTE — PROGRESS NOTES
Repeat hgb 11.2. Has been stable for 18 hours. Vital signs normal. Ok to d/c home. Precautions reviewed. Pt instructed to come in for beta on 7/20/24. Given script to take with her.

## 2024-07-18 NOTE — ED NOTES
TRANSFER - OUT REPORT:    Verbal report given to PAULETTE Gambino on Daisy Gasca  being transferred to [place] [address] for routine progression of patient care       Report consisted of patient's Situation, Background, Assessment and   Recommendations(SBAR).     Information from the following report(s) ED SBAR, MAR, Recent Results, and Neuro Assessment was reviewed with the receiving nurse.    Agenda Fall Assessment:    Presents to emergency department  because of falls (Syncope, seizure, or loss of consciousness): No  Age > 70: No  Altered Mental Status, Intoxication with alcohol or substance confusion (Disorientation, impaired judgment, poor safety awaremess, or inability to follow instructions): No  Impaired Mobility: Ambulates or transfers with assistive devices or assistance; Unable to ambulate or transer.: No  Nursing Judgement: No          Lines:   Peripheral IV 07/18/24 Right Antecubital (Active)   Site Assessment Clean, dry & intact 07/18/24 0014   Line Status Blood return noted 07/18/24 0014   Phlebitis Assessment No symptoms 07/18/24 0014   Infiltration Assessment 0 07/18/24 0014   Dressing Status New dressing applied 07/18/24 0014   Dressing Intervention New 07/18/24 0014        Opportunity for questions and clarification was provided.      Patient transported with:  [facility] personnel

## 2024-07-18 NOTE — H&P
Department of Gynecology  Attending History and Physical        CHIEF COMPLAINT:   abdominal pain    Reason for Admission:  possible ectopic pregnancy    History obtained from patient    HISTORY OF PRESENT ILLNESS:                     The patient is a 33 y.o. female para 3 who presents as [place] [facility] for concern of possible ectopic pregnancy in the setting of positive hcg and abdominal pain. It took about 9 hours for patient to get here. Patient is now not in pain. She last took pain medicine around 530A, tylenol and ibuprofen but has not needed anything since. She is feeling hungry.    Pain started last evening after intercourse  LMP 6/10-  Periods sometimes irregular  Does not have a regular gyn    No medical problems    OBHx:  3 children,  followed by 2 VBACs (last one here in , was getting prenatal care at [facility])    Past Medical History:    History reviewed. No pertinent past medical history.  Past Surgical History:        Procedure Laterality Date     SECTION  2016    HERNIA REPAIR N/A 10/27/2023    ROBOTIC VENTRAL HERNIA REPAIR WITH MESH performed by Flavio Beauchamp Jr., MD at [facility] MAIN OR         OB History    Para Term  AB Living   1             SAB IAB Ectopic Molar Multiple Live Births                    # Outcome Date GA Lbr Mark/2nd Weight Sex Delivery Anes PTL Lv   1 Current                Medications Prior to Admission:   Medications Prior to Admission: acetaminophen (TYLENOL) 325 MG tablet, Take 2 tablets by mouth every 6 hours as needed for Pain    Allergies:  Patient has no known allergies.     Social History:  TOBACCO:   reports that she has never smoked. She has never been exposed to tobacco smoke. She has never used smokeless tobacco.  ETOH:   reports no history of alcohol use.  DRUGS:  Never used recreational drugs    Family History:       Problem Relation Age of Onset    No Known Problems Mother     No Known Problems Father      >90 >60 ml/min/1.73m2    Calcium 8.8 8.5 - 10.1 MG/DL    Total Bilirubin 0.4 0.2 - 1.0 MG/DL    ALT 25 12 - 78 U/L    AST 17 15 - 37 U/L    Alk Phosphatase 80 45 - 117 U/L    Total Protein 7.5 6.4 - 8.2 g/dL    Albumin 3.8 3.5 - 5.0 g/dL    Globulin 3.7 2.0 - 4.0 g/dL    Albumin/Globulin Ratio 1.0 (L) 1.1 - 2.2     Lipase    Collection Time: 07/17/24 11:00 PM   Result Value Ref Range    Lipase 53 13 - 75 U/L   HCG, Quantitative, Pregnancy    Collection Time: 07/17/24 11:00 PM   Result Value Ref Range    hCG Quant 137 (H) 0 - 6 MIU/ML   POC Pregnancy Urine Qual    Collection Time: 07/17/24 11:51 PM   Result Value Ref Range    Preg Test, Ur Positive (A) NEG     TYPE AND SCREEN    Collection Time: 07/18/24 12:15 AM   Result Value Ref Range    Crossmatch expiration date 07/21/2024,2359     ABO/Rh O POSITIVE     Antibody Screen NEG    Hemoglobin and Hematocrit    Collection Time: 07/18/24  5:40 AM   Result Value Ref Range    Hemoglobin 11.3 (L) 11.5 - 16.0 g/dL    Hematocrit 31.9 (L) 35.0 - 47.0 %   CBC with Auto Differential    Collection Time: 07/18/24 12:15 PM   Result Value Ref Range    WBC 9.6 3.6 - 11.0 K/uL    RBC 3.78 (L) 3.80 - 5.20 M/uL    Hemoglobin 11.0 (L) 11.5 - 16.0 g/dL    Hematocrit 32.1 (L) 35.0 - 47.0 %    MCV 84.9 80.0 - 99.0 FL    MCH 29.1 26.0 - 34.0 PG    MCHC 34.3 30.0 - 36.5 g/dL    RDW 13.2 11.5 - 14.5 %    Platelets 250 150 - 400 K/uL    MPV 9.8 8.9 - 12.9 FL    Nucleated RBCs 0.0 0  WBC    nRBC 0.00 0.00 - 0.01 K/uL    Neutrophils % 52 32 - 75 %    Lymphocytes % 39 12 - 49 %    Monocytes % 5 5 - 13 %    Eosinophils % 4 0 - 7 %    Basophils % 0 0 - 1 %    Immature Granulocytes % 0 0.0 - 0.5 %    Neutrophils Absolute 4.9 1.8 - 8.0 K/UL    Lymphocytes Absolute 3.8 (H) 0.8 - 3.5 K/UL    Monocytes Absolute 0.5 0.0 - 1.0 K/UL    Eosinophils Absolute 0.4 0.0 - 0.4 K/UL    Basophils Absolute 0.0 0.0 - 0.1 K/UL    Immature Granulocytes Absolute 0.0 0.00 - 0.04 K/UL    Differential Type AUTOMATED

## 2024-07-18 NOTE — DISCHARGE SUMMARY
Neck , no lymphadenopathy Physician Discharge Summary     Patient ID:  Daisy Gasca  [id number]  33 y.o.  1990    Admit date: 7/17/2024    Discharge date and time: No discharge date for patient encounter.     Admitting Physician: Sulema Mo MD     Discharge Physician: Cindy Johnston MD    Admission Diagnoses: Abdominal pain, right lower quadrant [R10.31]  Early stage of pregnancy [Z34.90]  Abdominal pain in pregnancy, first trimester [O26.891, R10.9]    Discharge Diagnoses: pregnancy of unknown location    Admission Condition: stable    Discharged Condition: stable    Indication for Admission: observation    Hospital Course: pt sent [facility] due to abd pain, hcg 137, and unremarkable ultrasound. Pain improved and hgb remained stable over 18 hours. Discharged with instructions and script to return to ED on 7/20 for followup hcg.    Consults: none    Disposition: home    Patient Instructions:     Activity: pelvic rest  Diet: regular diet  Wound Care: none needed      Signed:  Cindy Johnston MD  7/18/2024  6:59 PM

## 2024-07-20 ENCOUNTER — HOSPITAL ENCOUNTER (EMERGENCY)
Facility: HOSPITAL | Age: 34
Discharge: HOME | End: 2024-07-20
Attending: STUDENT IN AN ORGANIZED HEALTH CARE EDUCATION/TRAINING PROGRAM
Payer: COMMERCIAL

## 2024-07-20 ENCOUNTER — APPOINTMENT (OUTPATIENT)
Facility: HOSPITAL | Age: 34
End: 2024-07-20
Payer: COMMERCIAL

## 2024-07-20 VITALS
RESPIRATION RATE: 16 BRPM | OXYGEN SATURATION: 99 % | HEART RATE: 75 BPM | TEMPERATURE: 97.5 F | SYSTOLIC BLOOD PRESSURE: 108 MMHG | DIASTOLIC BLOOD PRESSURE: 67 MMHG | BODY MASS INDEX: 31.16 KG/M2 | HEIGHT: 60 IN | WEIGHT: 158.73 LBS

## 2024-07-20 DIAGNOSIS — O00.102 LEFT TUBAL PREGNANCY WITHOUT INTRAUTERINE PREGNANCY: Primary | ICD-10-CM

## 2024-07-20 LAB
APPEARANCE UR: ABNORMAL
BACTERIA URNS QL MICRO: ABNORMAL /HPF
BASOPHILS # BLD: 0 K/UL (ref 0–0.1)
BASOPHILS NFR BLD: 0 % (ref 0–1)
BILIRUB UR QL: NEGATIVE
COLOR UR: ABNORMAL
COMMENT:: NORMAL
DIFFERENTIAL METHOD BLD: ABNORMAL
EOSINOPHIL # BLD: 0.6 K/UL (ref 0–0.4)
EOSINOPHIL NFR BLD: 6 % (ref 0–7)
EPITH CASTS URNS QL MICRO: ABNORMAL /LPF
ERYTHROCYTE [DISTWIDTH] IN BLOOD BY AUTOMATED COUNT: 13.4 % (ref 11.5–14.5)
GLUCOSE UR STRIP.AUTO-MCNC: NEGATIVE MG/DL
HCG SERPL-ACNC: 279 MIU/ML (ref 0–6)
HCG UR QL: POSITIVE
HCT VFR BLD AUTO: 34.2 % (ref 35–47)
HGB BLD-MCNC: 11.8 G/DL (ref 11.5–16)
HGB UR QL STRIP: NEGATIVE
IMM GRANULOCYTES # BLD AUTO: 0 K/UL (ref 0–0.04)
IMM GRANULOCYTES NFR BLD AUTO: 0 % (ref 0–0.5)
KETONES UR QL STRIP.AUTO: ABNORMAL MG/DL
LEUKOCYTE ESTERASE UR QL STRIP.AUTO: ABNORMAL
LYMPHOCYTES # BLD: 4.1 K/UL (ref 0.8–3.5)
LYMPHOCYTES NFR BLD: 46 % (ref 12–49)
MCH RBC QN AUTO: 30.2 PG (ref 26–34)
MCHC RBC AUTO-ENTMCNC: 34.5 G/DL (ref 30–36.5)
MCV RBC AUTO: 87.5 FL (ref 80–99)
MONOCYTES # BLD: 0.6 K/UL (ref 0–1)
MONOCYTES NFR BLD: 6 % (ref 5–13)
NEUTS SEG # BLD: 3.6 K/UL (ref 1.8–8)
NEUTS SEG NFR BLD: 41 % (ref 32–75)
NITRITE UR QL STRIP.AUTO: NEGATIVE
NRBC # BLD: 0 K/UL (ref 0–0.01)
NRBC BLD-RTO: 0 PER 100 WBC
PH UR STRIP: 6 (ref 5–8)
PLATELET # BLD AUTO: 294 K/UL (ref 150–400)
PMV BLD AUTO: 10.1 FL (ref 8.9–12.9)
PROT UR STRIP-MCNC: ABNORMAL MG/DL
RBC # BLD AUTO: 3.91 M/UL (ref 3.8–5.2)
RBC #/AREA URNS HPF: ABNORMAL /HPF (ref 0–5)
SP GR UR REFRACTOMETRY: >1.03
SPECIMEN HOLD: NORMAL
SPECIMEN HOLD: NORMAL
UROBILINOGEN UR QL STRIP.AUTO: 1 EU/DL (ref 0.2–1)
WBC # BLD AUTO: 8.9 K/UL (ref 3.6–11)
WBC URNS QL MICRO: ABNORMAL /HPF (ref 0–4)

## 2024-07-20 PROCEDURE — 96401 CHEMO ANTI-NEOPL SQ/IM: CPT

## 2024-07-20 PROCEDURE — 99284 EMERGENCY DEPT VISIT MOD MDM: CPT

## 2024-07-20 PROCEDURE — 36415 COLL VENOUS BLD VENIPUNCTURE: CPT

## 2024-07-20 PROCEDURE — 85025 COMPLETE CBC W/AUTO DIFF WBC: CPT

## 2024-07-20 PROCEDURE — 81025 URINE PREGNANCY TEST: CPT

## 2024-07-20 PROCEDURE — 6360000002 HC RX W HCPCS: Performed by: OBSTETRICS & GYNECOLOGY

## 2024-07-20 PROCEDURE — 81001 URINALYSIS AUTO W/SCOPE: CPT

## 2024-07-20 PROCEDURE — 76817 TRANSVAGINAL US OBSTETRIC: CPT

## 2024-07-20 PROCEDURE — 84702 CHORIONIC GONADOTROPIN TEST: CPT

## 2024-07-20 RX ORDER — METHOTREXATE 25 MG/ML
47.5 INJECTION INTRA-ARTERIAL; INTRAMUSCULAR; INTRATHECAL; INTRAVENOUS ONCE
Status: COMPLETED | OUTPATIENT
Start: 2024-07-20 | End: 2024-07-20

## 2024-07-20 RX ORDER — METHOTREXATE 25 MG/ML
50 INJECTION INTRA-ARTERIAL; INTRAMUSCULAR; INTRATHECAL; INTRAVENOUS ONCE
Status: DISCONTINUED | OUTPATIENT
Start: 2024-07-20 | End: 2024-07-20 | Stop reason: SDUPTHER

## 2024-07-20 RX ORDER — METHOTREXATE 25 MG/ML
40 INJECTION INTRA-ARTERIAL; INTRAMUSCULAR; INTRATHECAL; INTRAVENOUS ONCE
Status: COMPLETED | OUTPATIENT
Start: 2024-07-20 | End: 2024-07-20

## 2024-07-20 RX ADMIN — METHOTREXATE 47.5 MG: 25 INJECTION, SOLUTION INTRA-ARTERIAL; INTRAMUSCULAR; INTRATHECAL; INTRAVENOUS at 17:10

## 2024-07-20 RX ADMIN — METHOTREXATE 40 MG: 25 INJECTION, SOLUTION INTRA-ARTERIAL; INTRAMUSCULAR; INTRATHECAL; INTRAVENOUS at 17:09

## 2024-07-20 ASSESSMENT — PAIN SCALES - GENERAL: PAINLEVEL_OUTOF10: 0

## 2024-07-20 NOTE — CONSULTS
Department of Gynecology  Attending Consult Note      Reason for Consult:  ectopic pregnancy  Requesting Physician Assistant:  Trista Lucero    CHIEF COMPLAINT:   ectopic pregnancy    History obtained from patient    HISTORY OF PRESENT ILLNESS:                   The patient is a 34 y.o. female who s known to me from recent admission for observation for possible ectopic pregnancy. She was sent from [place] ED for abd pain, hcg of 137 and no clear pregnancy on ultrasound either IUP or ectopic. Hgb remained stable so she was discharged with instructions to return today for followup hcg.    She returned today and hcg went up to 279 and ultrasound suggests right ectopic pregnancy, no evidence of rupture. Hgb is stable at 11.8 (was 11.2 on ),     Still having mild pain but not terrible    She's here with partner today    Does not have a regular gyn     No medical problems     OBHx:  3 children,  followed by 2 VBACs (last one here in , was getting prenatal care at [facility])    Past Medical History:    No past medical history on file.  Past Surgical History:        Procedure Laterality Date     SECTION  2016    HERNIA REPAIR N/A 10/27/2023    ROBOTIC VENTRAL HERNIA REPAIR WITH MESH performed by Flavio Beauchamp Jr., MD at [facility] MAIN OR         OB History    Para Term  AB Living   1             SAB IAB Ectopic Molar Multiple Live Births                    # Outcome Date GA Lbr Mark/2nd Weight Sex Delivery Anes PTL Lv   1 Current                Medications Prior to Admission:   Not in a hospital admission.    Allergies:  Patient has no known allergies.     Social History:  TOBACCO:   reports that she has never smoked. She has never been exposed to tobacco smoke. She has never used smokeless tobacco.  ETOH:   reports no history of alcohol use.  DRUGS:   reports no history of drug use.    Family History:       Problem Relation Age of Onset    No Known Problems Mother     No Known

## 2024-07-20 NOTE — DISCHARGE INSTRUCTIONS
Return to the ER for new or worsening symptoms such as worsening pain, fever, dizziness or lightheadedness, vaginal bleeding that soaks through more than 1 pad per hour.  Return here to the ER on Tuesday, 7/23, for repeat hCG blood test.  You may have cramping over the next couple of days-you may take ibuprofen, 2 to 3 tablets every 6-8 hours, as needed.

## 2024-07-20 NOTE — ED PROVIDER NOTES
[facility] EMERGENCY DEP  EMERGENCY DEPARTMENT ENCOUNTER      Pt Name: Daisy Gasca  MRN: [id number]  Birthdate 1990  Date of evaluation: 2024  Provider: FILIPE Otoole    CHIEF COMPLAINT       Chief Complaint   Patient presents with    Follow-Up from Hospital         HISTORY OF PRESENT ILLNESS   (Location/Symptom, Timing/Onset, Context/Setting, Quality, Duration, Modifying Factors, Severity)  Note limiting factors.   34 year old F . LMP: 6/10 presenting for follow up.  Patient was seen here on -initially evaluated at [facility], had an hCG of about 137 and an ultrasound showing a cystic structure, no IUP so unable to rule out ectopic gestation-patient was transferred here to [facility] for evaluation by OB/GYN, was advised to return here in 48 hours for repeat hCG.  When asked about pain, pt reports that she has \"a little bit, it doesn't hurt that much.\"  Midline, denies lateralization of pain.  No UTI symptoms or bleeding.      PMHx: denies  Psx: , hernia repair  Social: denies tobacco, alcohol, or drug use.  \"Housewife.\"    The history is provided by the patient and medical records. A  was used ([id number]).         Review of External Medical Records:     Nursing Notes were reviewed.    REVIEW OF SYSTEMS    (2-9 systems for level 4, 10 or more for level 5)     Review of Systems   Genitourinary:  Positive for pelvic pain.       Except as noted above the remainder of the review of systems was reviewed and negative.       PAST MEDICAL HISTORY   No past medical history on file.      SURGICAL HISTORY       Past Surgical History:   Procedure Laterality Date     SECTION  2016    HERNIA REPAIR N/A 10/27/2023    ROBOTIC VENTRAL HERNIA REPAIR WITH MESH performed by Flavio Beauchamp Jr., MD at [facility] MAIN OR         CURRENT MEDICATIONS       Previous Medications    ACETAMINOPHEN (TYLENOL) 325 MG TABLET    Take 2 tablets by mouth every 6 hours as needed for  Pain       ALLERGIES     Patient has no known allergies.    FAMILY HISTORY       Family History   Problem Relation Age of Onset    No Known Problems Mother     No Known Problems Father     No Known Problems Sister     No Known Problems Sister     No Known Problems Brother     Anesth Problems Neg Hx           SOCIAL HISTORY       Social History     Socioeconomic History    Marital status: Single   Tobacco Use    Smoking status: Never     Passive exposure: Never    Smokeless tobacco: Never   Vaping Use    Vaping Use: Never used   Substance and Sexual Activity    Alcohol use: Never    Drug use: Never    Sexual activity: Yes     Partners: Male           PHYSICAL EXAM    (up to 7 for level 4, 8 or more for level 5)     ED Triage Vitals   BP Temp Temp src Pulse Resp SpO2 Height Weight   -- -- -- -- -- -- -- --       Body mass index is 31 kg/m².    Physical Exam  Vitals and nursing note reviewed.   Constitutional:       General: She is not in acute distress.     Appearance: She is not ill-appearing.      Comments: Pleasant, well-appearing, no distress   HENT:      Head: Normocephalic.      Nose: Nose normal.   Eyes:      General: No scleral icterus.  Cardiovascular:      Rate and Rhythm: Normal rate and regular rhythm.      Heart sounds: No murmur heard.  Pulmonary:      Effort: Pulmonary effort is normal. No respiratory distress.      Breath sounds: No wheezing.   Abdominal:      Tenderness: There is no abdominal tenderness.   Musculoskeletal:         General: Normal range of motion.      Cervical back: Normal range of motion.   Skin:     General: Skin is warm and dry.   Neurological:      Mental Status: She is alert and oriented to person, place, and time.   Psychiatric:         Mood and Affect: Mood normal.         DIAGNOSTIC RESULTS     EKG: All EKG's are interpreted by the Emergency Department Physician who either signs or Co-signs this chart in the absence of a cardiologist.        RADIOLOGY:   Non-plain film images

## 2024-07-20 NOTE — ED TRIAGE NOTES
Pt referred for follow up and repeat hcg testing after questionable ectopic pregnancy on US on 7/17

## 2024-07-24 ENCOUNTER — HOSPITAL ENCOUNTER (EMERGENCY)
Facility: HOSPITAL | Age: 34
Discharge: HOME | End: 2024-07-24
Attending: EMERGENCY MEDICINE
Payer: COMMERCIAL

## 2024-07-24 VITALS
HEART RATE: 74 BPM | BODY MASS INDEX: 31.22 KG/M2 | OXYGEN SATURATION: 100 % | SYSTOLIC BLOOD PRESSURE: 108 MMHG | TEMPERATURE: 98.4 F | WEIGHT: 159.83 LBS | DIASTOLIC BLOOD PRESSURE: 70 MMHG | RESPIRATION RATE: 20 BRPM

## 2024-07-24 DIAGNOSIS — R79.89 ELEVATED SERUM HCG: Primary | ICD-10-CM

## 2024-07-24 DIAGNOSIS — Z87.59 HX OF ECTOPIC PREGNANCY: ICD-10-CM

## 2024-07-24 LAB
ANION GAP SERPL CALC-SCNC: 2 MMOL/L (ref 5–15)
BASOPHILS # BLD: 0.1 K/UL (ref 0–0.1)
BASOPHILS NFR BLD: 1 % (ref 0–1)
BUN SERPL-MCNC: 13 MG/DL (ref 6–20)
BUN/CREAT SERPL: 18 (ref 12–20)
CALCIUM SERPL-MCNC: 9.2 MG/DL (ref 8.5–10.1)
CHLORIDE SERPL-SCNC: 107 MMOL/L (ref 97–108)
CO2 SERPL-SCNC: 26 MMOL/L (ref 21–32)
CREAT SERPL-MCNC: 0.71 MG/DL (ref 0.55–1.02)
DIFFERENTIAL METHOD BLD: ABNORMAL
EOSINOPHIL # BLD: 0.5 K/UL (ref 0–0.4)
EOSINOPHIL NFR BLD: 6 % (ref 0–7)
ERYTHROCYTE [DISTWIDTH] IN BLOOD BY AUTOMATED COUNT: 13 % (ref 11.5–14.5)
GLUCOSE SERPL-MCNC: 98 MG/DL (ref 65–100)
HCG SERPL-ACNC: 252 MIU/ML (ref 0–6)
HCT VFR BLD AUTO: 36.2 % (ref 35–47)
HGB BLD-MCNC: 12.1 G/DL (ref 11.5–16)
IMM GRANULOCYTES # BLD AUTO: 0 K/UL (ref 0–0.04)
IMM GRANULOCYTES NFR BLD AUTO: 0 % (ref 0–0.5)
LYMPHOCYTES # BLD: 3.8 K/UL (ref 0.8–3.5)
LYMPHOCYTES NFR BLD: 43 % (ref 12–49)
MCH RBC QN AUTO: 29.7 PG (ref 26–34)
MCHC RBC AUTO-ENTMCNC: 33.4 G/DL (ref 30–36.5)
MCV RBC AUTO: 88.7 FL (ref 80–99)
MONOCYTES # BLD: 0.4 K/UL (ref 0–1)
MONOCYTES NFR BLD: 5 % (ref 5–13)
NEUTS SEG # BLD: 4 K/UL (ref 1.8–8)
NEUTS SEG NFR BLD: 45 % (ref 32–75)
NRBC # BLD: 0 K/UL (ref 0–0.01)
NRBC BLD-RTO: 0 PER 100 WBC
PLATELET # BLD AUTO: 283 K/UL (ref 150–400)
PMV BLD AUTO: 9.9 FL (ref 8.9–12.9)
POTASSIUM SERPL-SCNC: 5 MMOL/L (ref 3.5–5.1)
RBC # BLD AUTO: 4.08 M/UL (ref 3.8–5.2)
SODIUM SERPL-SCNC: 135 MMOL/L (ref 136–145)
WBC # BLD AUTO: 8.7 K/UL (ref 3.6–11)

## 2024-07-24 PROCEDURE — 85025 COMPLETE CBC W/AUTO DIFF WBC: CPT

## 2024-07-24 PROCEDURE — 36415 COLL VENOUS BLD VENIPUNCTURE: CPT

## 2024-07-24 PROCEDURE — 99283 EMERGENCY DEPT VISIT LOW MDM: CPT

## 2024-07-24 PROCEDURE — 80048 BASIC METABOLIC PNL TOTAL CA: CPT

## 2024-07-24 PROCEDURE — 84702 CHORIONIC GONADOTROPIN TEST: CPT

## 2024-07-24 ASSESSMENT — ENCOUNTER SYMPTOMS: ABDOMINAL PAIN: 0

## 2024-07-24 NOTE — ED NOTES
Patient discharged by FILIPE Parikh. Discharge information / home RX / and reasons to return to the ED were reviewed by the ED provider. The patient verbalized understanding. She left ambulatory in stable condition, no acute distress noted.

## 2024-07-24 NOTE — DISCHARGE INSTRUCTIONS
You are seen today in the emergency department for repeat testing of your hCG.  It does appear to be going down.  It needs to be tested again on the 27th to ensure it is decreasing at an appropriate rate.  Return to this department on the 27th for repeat testing.

## 2024-07-24 NOTE — ED TRIAGE NOTES
Per  # [id number]    Patient is coming in to have pregnancy hormones rechecked. Patient was seen here 4 days ago for ectopic and has taken medicine. Denies pain

## 2024-07-27 ENCOUNTER — HOSPITAL ENCOUNTER (EMERGENCY)
Facility: HOSPITAL | Age: 34
Discharge: HOME | End: 2024-07-27
Attending: EMERGENCY MEDICINE
Payer: COMMERCIAL

## 2024-07-27 VITALS
BODY MASS INDEX: 31.22 KG/M2 | TEMPERATURE: 98.4 F | DIASTOLIC BLOOD PRESSURE: 67 MMHG | RESPIRATION RATE: 18 BRPM | HEIGHT: 60 IN | HEART RATE: 70 BPM | SYSTOLIC BLOOD PRESSURE: 106 MMHG | OXYGEN SATURATION: 99 % | WEIGHT: 159 LBS

## 2024-07-27 DIAGNOSIS — Z92.21 H/O METHOTREXATE THERAPY: ICD-10-CM

## 2024-07-27 DIAGNOSIS — Z87.59 HISTORY OF ECTOPIC PREGNANCY: ICD-10-CM

## 2024-07-27 DIAGNOSIS — R79.89 ELEVATED SERUM HCG: Primary | ICD-10-CM

## 2024-07-27 LAB
ALBUMIN SERPL-MCNC: 3.7 G/DL (ref 3.5–5)
ALBUMIN/GLOB SERPL: 1.1 (ref 1.1–2.2)
ALP SERPL-CCNC: 95 U/L (ref 45–117)
ALT SERPL-CCNC: 34 U/L (ref 12–78)
ANION GAP SERPL CALC-SCNC: 6 MMOL/L (ref 5–15)
AST SERPL-CCNC: 15 U/L (ref 15–37)
BASOPHILS # BLD: 0 K/UL (ref 0–0.1)
BASOPHILS NFR BLD: 0 % (ref 0–1)
BILIRUB SERPL-MCNC: 0.3 MG/DL (ref 0.2–1)
BUN SERPL-MCNC: 12 MG/DL (ref 6–20)
BUN/CREAT SERPL: 20 (ref 12–20)
CALCIUM SERPL-MCNC: 8.6 MG/DL (ref 8.5–10.1)
CHLORIDE SERPL-SCNC: 107 MMOL/L (ref 97–108)
CO2 SERPL-SCNC: 25 MMOL/L (ref 21–32)
COMMENT:: NORMAL
CREAT SERPL-MCNC: 0.59 MG/DL (ref 0.55–1.02)
DIFFERENTIAL METHOD BLD: ABNORMAL
EOSINOPHIL # BLD: 0.5 K/UL (ref 0–0.4)
EOSINOPHIL NFR BLD: 5 % (ref 0–7)
ERYTHROCYTE [DISTWIDTH] IN BLOOD BY AUTOMATED COUNT: 13.4 % (ref 11.5–14.5)
GLOBULIN SER CALC-MCNC: 3.5 G/DL (ref 2–4)
GLUCOSE SERPL-MCNC: 115 MG/DL (ref 65–100)
HCG SERPL-ACNC: 55 MIU/ML (ref 0–6)
HCT VFR BLD AUTO: 34.2 % (ref 35–47)
HGB BLD-MCNC: 11.6 G/DL (ref 11.5–16)
IMM GRANULOCYTES # BLD AUTO: 0 K/UL (ref 0–0.04)
IMM GRANULOCYTES NFR BLD AUTO: 0 % (ref 0–0.5)
LYMPHOCYTES # BLD: 3.4 K/UL (ref 0.8–3.5)
LYMPHOCYTES NFR BLD: 37 % (ref 12–49)
MCH RBC QN AUTO: 29.9 PG (ref 26–34)
MCHC RBC AUTO-ENTMCNC: 33.9 G/DL (ref 30–36.5)
MCV RBC AUTO: 88.1 FL (ref 80–99)
MONOCYTES # BLD: 0.5 K/UL (ref 0–1)
MONOCYTES NFR BLD: 6 % (ref 5–13)
NEUTS SEG # BLD: 4.7 K/UL (ref 1.8–8)
NEUTS SEG NFR BLD: 52 % (ref 32–75)
NRBC # BLD: 0 K/UL (ref 0–0.01)
NRBC BLD-RTO: 0 PER 100 WBC
PLATELET # BLD AUTO: 310 K/UL (ref 150–400)
PMV BLD AUTO: 9.7 FL (ref 8.9–12.9)
POTASSIUM SERPL-SCNC: 3.4 MMOL/L (ref 3.5–5.1)
PROT SERPL-MCNC: 7.2 G/DL (ref 6.4–8.2)
RBC # BLD AUTO: 3.88 M/UL (ref 3.8–5.2)
SODIUM SERPL-SCNC: 138 MMOL/L (ref 136–145)
SPECIMEN HOLD: NORMAL
WBC # BLD AUTO: 9.1 K/UL (ref 3.6–11)

## 2024-07-27 PROCEDURE — 85025 COMPLETE CBC W/AUTO DIFF WBC: CPT

## 2024-07-27 PROCEDURE — 99283 EMERGENCY DEPT VISIT LOW MDM: CPT

## 2024-07-27 PROCEDURE — 36415 COLL VENOUS BLD VENIPUNCTURE: CPT

## 2024-07-27 PROCEDURE — 84702 CHORIONIC GONADOTROPIN TEST: CPT

## 2024-07-27 PROCEDURE — 80053 COMPREHEN METABOLIC PANEL: CPT

## 2024-07-27 ASSESSMENT — PAIN - FUNCTIONAL ASSESSMENT: PAIN_FUNCTIONAL_ASSESSMENT: NONE - DENIES PAIN

## 2024-07-27 NOTE — DISCHARGE INSTRUCTIONS
Discussed visit today. hCG down to 55, but you need to return 1 week from today for repeat levels. We will need to monitor this down to 0.     Return to the ER sooner as needed.     Visita discutida hoy. hCG bajó a 55, cristin debe regresar dentro de 1 semana a partir de hoy para repetir los niveles. Necesitaremos monitorear esto hasta 0.     Regrese a la trent de emergencias antes según sea necesario.

## 2024-07-27 NOTE — ED PROVIDER NOTES
[facility] EMERGENCY DEP  EMERGENCY DEPARTMENT ENCOUNTER      Pt Name: Daisy Gasca  MRN: [id number]  Birthdate 1990  Date of evaluation: 2024  Provider: Haim Gordon PA-C    CHIEF COMPLAINT       Chief Complaint   Patient presents with    Vaginal Bleeding         HISTORY OF PRESENT ILLNESS    Patient is an 34 y.o. female with history of hernia repair and  who presents to the ER with reports of needing repeat HCG completed. Patient was seen on  and  for ectopic pregnancy and given Methotrexate. Patient was told to come back to the ER for repeat HCG today. Patient reports she has been having some vaginal bleeding and this morning with blood clots. Patient reports some pain yesterday, but none today. Patient denies chest pain, shortness of breath, abdominal pain, urinary symptoms, nausea or vomiting, diarrhea or constipation, headache, dizziness, lightheadedness, fever or chills.          and      Nursing Notes were reviewed.    REVIEW OF SYSTEMS       Review of Systems      PAST MEDICAL HISTORY   History reviewed. No pertinent past medical history.      SURGICAL HISTORY       Past Surgical History:   Procedure Laterality Date     SECTION  2016    HERNIA REPAIR N/A 10/27/2023    ROBOTIC VENTRAL HERNIA REPAIR WITH MESH performed by Flavio Beauchamp Jr., MD at [facility] MAIN OR         CURRENT MEDICATIONS       Discharge Medication List as of 2024  7:21 PM        CONTINUE these medications which have NOT CHANGED    Details   acetaminophen (TYLENOL) 325 MG tablet Take 2 tablets by mouth every 6 hours as needed for PainHistorical Med             ALLERGIES     Patient has no known allergies.    FAMILY HISTORY       Family History   Problem Relation Age of Onset    No Known Problems Mother     No Known Problems Father     No Known Problems Sister     No Known Problems Sister     No Known Problems Brother     Anesth Problems Neg Hx           SOCIAL HISTORY       Social History  mis-transcribed.)    Haim Gordon PA-C (electronically signed)  Physician Assistant            Haim Gordon PA-C  07/27/24 2025

## 2024-07-27 NOTE — PROGRESS NOTES
DATE 7/27 TIME 1137    Attempted to call pt via [employer]  for follow up of HCG.    Called [phone] and [phone] both numbers received no answer and both mailboxes were full and could not accept a message.  Pt is due for repeat HCG today, last drawn 7/24 following MTX for right ectopic on 7/20.    HCG trend as follows:  7/17 137  7/20 279 --> MTX given  7/24 252  7/27 ____ needs to be drawn and demonstrate a fall to at least 214 for MTX to be considered working; then needs weekly HCG levels until zero

## 2024-07-27 NOTE — ED TRIAGE NOTES
TRIAGE NOTE:  Patient arrives for repeat blood work for a HGB beta quant.   used, patient had the opportunity to ask questions.  Daughter with patient.

## 2024-07-27 NOTE — PROGRESS NOTES
DATE 7/27 TIME 1137     Attempted to call pt via [employer]  for follow up of HCG.    Called [phone] and [phone] both numbers received no answer and both mailboxes were full and could not accept a message.  Pt is due for repeat HCG today, last drawn 7/24 following MTX for right ectopic on 7/20.     HCG trend as follows:  7/17 137  7/20 279 --> MTX given  7/24 252  7/27 ____ needs to be drawn and demonstrate a fall to at least 214 for MTX to be considered working; then needs weekly HCG levels until zero

## 2024-08-03 ENCOUNTER — HOSPITAL ENCOUNTER (EMERGENCY)
Facility: HOSPITAL | Age: 34
Discharge: HOME | End: 2024-08-03
Attending: STUDENT IN AN ORGANIZED HEALTH CARE EDUCATION/TRAINING PROGRAM
Payer: COMMERCIAL

## 2024-08-03 VITALS
OXYGEN SATURATION: 98 % | HEIGHT: 59 IN | RESPIRATION RATE: 18 BRPM | DIASTOLIC BLOOD PRESSURE: 68 MMHG | WEIGHT: 160.5 LBS | HEART RATE: 60 BPM | SYSTOLIC BLOOD PRESSURE: 107 MMHG | TEMPERATURE: 98.3 F | BODY MASS INDEX: 32.36 KG/M2

## 2024-08-03 DIAGNOSIS — Z92.21 HISTORY OF METHOTREXATE THERAPY: ICD-10-CM

## 2024-08-03 DIAGNOSIS — Z87.59 HISTORY OF ECTOPIC PREGNANCY: Primary | ICD-10-CM

## 2024-08-03 LAB
COMMENT:: NORMAL
HCG SERPL-ACNC: 1 MIU/ML (ref 0–6)
SPECIMEN HOLD: NORMAL
SPECIMEN HOLD: NORMAL

## 2024-08-03 PROCEDURE — 99283 EMERGENCY DEPT VISIT LOW MDM: CPT

## 2024-08-03 PROCEDURE — 84702 CHORIONIC GONADOTROPIN TEST: CPT

## 2024-08-03 PROCEDURE — 36415 COLL VENOUS BLD VENIPUNCTURE: CPT

## 2024-08-03 ASSESSMENT — ENCOUNTER SYMPTOMS
NAUSEA: 0
COUGH: 0
ABDOMINAL PAIN: 0
DIARRHEA: 0
SHORTNESS OF BREATH: 0
VOMITING: 0
BACK PAIN: 0
TROUBLE SWALLOWING: 0

## 2024-08-03 ASSESSMENT — PAIN - FUNCTIONAL ASSESSMENT: PAIN_FUNCTIONAL_ASSESSMENT: NONE - DENIES PAIN

## 2024-08-03 NOTE — ED PROVIDER NOTES
[facility] EMERGENCY DEP  EMERGENCY DEPARTMENT ENCOUNTER      Pt Name: Daisy Gasca  MRN: [id number]  Birthdate 1990  Date of evaluation: 8/3/2024  Provider: BRYSON Senior NP    CHIEF COMPLAINT       Chief Complaint   Patient presents with    Follow-up         HISTORY OF PRESENT ILLNESS   (Location/Symptom, Timing/Onset, Context/Setting, Quality, Duration, Modifying Factors, Severity)  Note limiting factors.   HPI patient is a 34-year-old  female with history of hernia repair and  who presents to the ED for a repeat hCG.  She has been followed since 2024 for consistent hCGs to ensure return to 0 level.  Last hCG on 2024 was 55.  She denies any fever, complaints of pain or discomfort.Denies any difficulty breathing, difficulty swallowing, SOB, chest pain, abdominal pain or back pain. Denies any nausea, vomiting or diarrhea. Pt. Reports that she has not had any medications today prior to arrival.  Old charts reviewed.  Language line employed.  Blood type O positive      Review of External Medical Records:     Nursing Notes were reviewed.    REVIEW OF SYSTEMS    (2-9 systems for level 4, 10 or more for level 5)     Review of Systems   Constitutional:  Negative for activity change, appetite change, chills, fever and unexpected weight change.   HENT:  Negative for congestion and trouble swallowing.    Eyes:  Negative for visual disturbance.   Respiratory:  Negative for cough and shortness of breath.    Cardiovascular:  Negative for chest pain, palpitations and leg swelling.   Gastrointestinal:  Negative for abdominal pain, diarrhea, nausea and vomiting.   Genitourinary:  Negative for dysuria, pelvic pain, vaginal bleeding and vaginal discharge.   Musculoskeletal:  Negative for back pain and neck pain.   Neurological:  Negative for headaches.   All other systems reviewed and are negative.      Except as noted above the remainder of the review of systems was reviewed and  prognosis and additionally agree to follow up as recommended or return to the Emergency Room should her condition change prior to follow-up.  Discharge instructions have also been provided to the patient with some educational information regarding her diagnosis as well a list of reasons why she would want to return to the ER prior to her follow-up appointment should her condition change.          CONSULTS:  None    PROCEDURES:  Unless otherwise noted below, none     Procedures      FINAL IMPRESSION      1. History of ectopic pregnancy    2. History of methotrexate therapy          DISPOSITION/PLAN   DISPOSITION Decision To Discharge 08/03/2024 08:13:08 PM      PATIENT REFERRED TO:  [facility]  [address] [place] [address] [phone]    As needed, If symptoms worsen    Jennifer Escobar MD  [address] [address] [phone]      As needed, If symptoms worsen      DISCHARGE MEDICATIONS:  New Prescriptions    No medications on file         (Please note that portions of this note were completed with a voice recognition program.  Efforts were made to edit the dictations but occasionally words are mis-transcribed.)    BRYSON Senior NP (electronically signed)  Emergency Attending Physician / Physician Assistant / Nurse Practitioner             Alisa Rayo APRN - NP  08/03/24 2019

## 2025-03-14 NOTE — PERIOP NOTE
[address] INSTRUCTIONS    Surgery Date:   10/27/2023    Your surgeon's office or [facility] staff will call you between 4 PM- 8 PM the day before surgery with your arrival time. If your surgery is on a Monday, you will receive a call the preceding Friday. If your surgeon's office has given you, your arrival time then go by that time. Please report to [facility] Patient Access/Admitting on the 1st floor. Bring your insurance card, photo identification, and any copayment ( if applicable). If you are going home the same day of your surgery, you must have a responsible adult to drive you home. You need to have a responsible adult to stay with you the first 24 hours after surgery and you should not drive a car for 24 hours following your surgery. If you are being admitted to the hospital, please leave personal belongings/luggage in your car until you have an assigned hospital room number. Do NOT drink alcohol or smoke 24 hours before surgery. STOP smoking for 14 days prior as it helps with breathing and healing after surgery. Please wear comfortable clothes. Wear your glasses instead of contacts. We ask that all money, jewelry and valuables be left at home. Wear no make up, particularly mascara, the day of surgery. All body piercings, rings, and jewelry need to be removed and left at home. Remove all nail polish except for clear. Please wear your hair loose or down, no pony-tails, buns, or any metal hair accessories. You may wear deodorant, unless having breast surgery. Do not shave any body area within 24 hours of your surgery. Please follow all instructions to avoid any potential surgical cancellation. Should your physical condition change, (i.e. fever, cold, flu, etc.) please notify your surgeon as soon as possible. It is important to be on time. If a situation occurs where you may be delayed, please call:  [phone] / [phone] on the day of surgery.   The Preadmission PAST MEDICAL HISTORY:  DM (diabetes mellitus)     GERD (gastroesophageal reflux disease)     HLD (hyperlipidemia)     HTN (hypertension)     Myocardial infarct

## (undated) DEVICE — SOLUTION ANTIFOG VIS SYS CLEARIFY LAPSCP

## (undated) DEVICE — GARMENT,MEDLINE,DVT,INT,CALF,MED, GEN2: Brand: MEDLINE

## (undated) DEVICE — GLOVE ORANGE PI 7 1/2   MSG9075

## (undated) DEVICE — PACK,BASIC,SIRUS,V: Brand: MEDLINE

## (undated) DEVICE — GENERAL LAPAROSCOPY - SMH: Brand: MEDLINE INDUSTRIES, INC.

## (undated) DEVICE — TIP COVER ACCESSORY

## (undated) DEVICE — LIQUIBAND RAPID ADHESIVE 36/CS 0.8ML: Brand: MEDLINE

## (undated) DEVICE — SUTURE STRATAFIX SYMMETRIC PDS + SZ 1 L18IN ABSRB VLT L48MM SXPP1A400

## (undated) DEVICE — SUTURE VCRL SZ 3-0 L27IN ABSRB VLT L26MM SH 1/2 CIR J316H

## (undated) DEVICE — SOLUTION IRRIG 1000ML 0.9% SOD CHL USP POUR PLAS BTL

## (undated) DEVICE — OBTURATOR ROBOTIC DIA8MM BLDELSS ENDOSCP DISP DA VINCI SI

## (undated) DEVICE — GLOVE SURG SZ 8 L12IN FNGR THK79MIL GRN LTX FREE

## (undated) DEVICE — TROCAR ENDOSCP L100MM DIA5MM BLDELSS STBL SL THRD OPT VW

## (undated) DEVICE — ARM DRAPE

## (undated) DEVICE — SUTURE MCRYL SZ 4-0 L27IN ABSRB UD L19MM PS-2 1/2 CIR PRIM Y426H

## (undated) DEVICE — SUTURE STRATAFIX SPRL PDS + VLT 3-0 15CM DISPOSABLE/SNGLE SXPP1B420

## (undated) DEVICE — PAD PT POS 36 IN SURGYPAD DISP